# Patient Record
Sex: FEMALE | Race: WHITE | NOT HISPANIC OR LATINO | Employment: OTHER | URBAN - METROPOLITAN AREA
[De-identification: names, ages, dates, MRNs, and addresses within clinical notes are randomized per-mention and may not be internally consistent; named-entity substitution may affect disease eponyms.]

---

## 2017-10-14 PROBLEM — G45.1 TIA INVOLVING CAROTID ARTERY: Status: ACTIVE | Noted: 2017-10-14

## 2017-10-14 PROBLEM — G45.9 TRANSIENT CEREBRAL ISCHEMIA: Status: ACTIVE | Noted: 2017-10-14

## 2017-10-15 PROBLEM — G45.1 TIA INVOLVING CAROTID ARTERY: Status: ACTIVE | Noted: 2017-10-15

## 2018-05-24 ENCOUNTER — OFFICE VISIT (OUTPATIENT)
Dept: PRIMARY CARE CLINIC | Facility: CLINIC | Age: 63
End: 2018-05-24
Payer: COMMERCIAL

## 2018-05-24 VITALS
TEMPERATURE: 99 F | WEIGHT: 137.25 LBS | HEIGHT: 62 IN | DIASTOLIC BLOOD PRESSURE: 64 MMHG | SYSTOLIC BLOOD PRESSURE: 104 MMHG | BODY MASS INDEX: 25.26 KG/M2 | OXYGEN SATURATION: 97 % | HEART RATE: 51 BPM

## 2018-05-24 DIAGNOSIS — H61.23 BILATERAL IMPACTED CERUMEN: ICD-10-CM

## 2018-05-24 DIAGNOSIS — J02.9 ACUTE PHARYNGITIS, UNSPECIFIED ETIOLOGY: Primary | ICD-10-CM

## 2018-05-24 DIAGNOSIS — B34.9 VIRAL ILLNESS: ICD-10-CM

## 2018-05-24 LAB
CTP QC/QA: YES
S PYO RRNA THROAT QL PROBE: NEGATIVE

## 2018-05-24 PROCEDURE — 99999 PR PBB SHADOW E&M-EST. PATIENT-LVL IV: CPT | Mod: PBBFAC,,, | Performed by: NURSE PRACTITIONER

## 2018-05-24 PROCEDURE — 87081 CULTURE SCREEN ONLY: CPT

## 2018-05-24 PROCEDURE — 87880 STREP A ASSAY W/OPTIC: CPT | Mod: QW,S$GLB,, | Performed by: NURSE PRACTITIONER

## 2018-05-24 PROCEDURE — 99214 OFFICE O/P EST MOD 30 MIN: CPT | Mod: 25,S$GLB,, | Performed by: NURSE PRACTITIONER

## 2018-05-24 NOTE — PATIENT INSTRUCTIONS
Viral Pharyngitis (Sore Throat)    You (or your child, if your child is the patient) have pharyngitis (sore throat). This infection is caused by a virus. It can cause throat pain that is worse when swallowing, aching all over, headache, and fever. The infection may be spread by coughing, kissing, or touching others after touching your mouth or nose. Antibiotic medications do not work against viruses, so they are not used for treating this condition.  Home care  · If your symptoms are severe, rest at home. Return to work or school when you feel well enough.   · Drink plenty of fluids to avoid dehydration.  · For children: Use acetaminophen for fever, fussiness or discomfort. In infants over six months of age, you may use ibuprofen instead of acetaminophen. (NOTE: If your child has chronic liver or kidney disease or ever had a stomach ulcer or GI bleeding, talk with your doctor before using these medicines.) (NOTE: Aspirin should never be used in anyone under 18 years of age who is ill with a fever. It may cause severe liver damage.)   · For adults: You may use acetaminophen or ibuprofen to control pain or fever, unless another medicine was prescribed for this. (NOTE: If you have chronic liver or kidney disease or ever had a stomach ulcer or GI bleeding, talk with your doctor before using these medicines.)  · Throat lozenges or numbing throat sprays can help reduce pain. Gargling with warm salt water will also help reduce throat pain. For this, dissolve 1/2 teaspoon of salt in 1 glass of warm water. To help soothe a sore throat, children can sip on juice or a popsicle. Children 5 years and older can also suck on a lollipop or hard candy.  · Avoid salty or spicy foods, which can be irritating to the throat.  Follow-up care  Follow up with your healthcare provider or our staff if you are not improving over the next week.  When to seek medical advice  Call your healthcare provider right away if any of these  occur:  · Fever as directed by your doctor.  For children, seek care if:  ¨ Your child is of any age and has repeated fevers above 104°F (40°C).  ¨ Your child is younger than 2 years of age and has a fever of 100.4°F (38°C) that continues for more than 1 day.  ¨ Your child is 2 years old or older and has a fever of 100.4°F (38°C) that continues for more than 3 days.  · New or worsening ear pain, sinus pain, or headache  · Painful lumps in the back of neck  · Stiff neck  · Lymph nodes are getting larger  · Inability to swallow liquids, excessive drooling, or inability to open mouth wide due to throat pain  · Signs of dehydration (very dark urine or no urine, sunken eyes, dizziness)  · Trouble breathing or noisy breathing  · Muffled voice  · New rash  · Child appears to be getting sicker  Date Last Reviewed: 4/13/2015  © 6526-3010 [employer]. [address] [place] [address], [place] [address]. All rights reserved. This information is not intended as a substitute for professional medical care. Always follow your healthcare professional's instructions.

## 2018-05-24 NOTE — PROGRESS NOTES
Subjective:       Patient ID: Chance Napier is a 62 y.o. female.    Chief Complaint: Sore Throat (6 days- ears feel clogged)    Patient who is new to me presents with a new problem of sore throat. Patient had strep throat 2 months ago. Feels as though her ears are clogged. Last time she had this her ears required cleaning.       Sore Throat    This is a new problem. The current episode started in the past 7 days. The problem has been unchanged. Neither side of throat is experiencing more pain than the other. There has been no fever. Associated symptoms include ear pain. Pertinent negatives include no abdominal pain, congestion, coughing, diarrhea, headaches, neck pain, shortness of breath, trouble swallowing or vomiting. She has had exposure to strep (recently had strep). Treatments tried: mucinex and nyquil. The treatment provided mild relief.     Review of Systems   Constitutional: Negative for chills, fatigue and fever.   HENT: Positive for ear pain and sore throat. Negative for congestion, sinus pain, sinus pressure, sneezing and trouble swallowing.    Respiratory: Negative for cough, chest tightness, shortness of breath and wheezing.    Cardiovascular: Negative for chest pain, palpitations and leg swelling.   Gastrointestinal: Negative for abdominal distention, abdominal pain, constipation, diarrhea, nausea and vomiting.   Genitourinary: Negative for decreased urine volume, difficulty urinating, dysuria, frequency and urgency.   Musculoskeletal: Negative for arthralgias, gait problem, joint swelling, myalgias and neck pain.   Skin: Negative for rash and wound.   Neurological: Negative for dizziness, light-headedness, numbness and headaches.       Objective:      Physical Exam   Constitutional: She is oriented to person, place, and time. She appears well-developed and well-nourished.   HENT:   Head: Normocephalic and atraumatic.   Right Ear: External ear and ear canal normal.   Left Ear: External ear and  ear canal normal.   Nose: Nose normal.   Mouth/Throat: Posterior oropharyngeal erythema present.   Bilateral cerumen impaction   Eyes: Pupils are equal, round, and reactive to light.   Neck: Normal range of motion.   Cardiovascular: Normal rate, regular rhythm, normal heart sounds and intact distal pulses.    Pulmonary/Chest: Effort normal and breath sounds normal.   Abdominal: Soft. Bowel sounds are normal.   Musculoskeletal: Normal range of motion.   Neurological: She is alert and oriented to person, place, and time.   Skin: Skin is warm and dry.   Nursing note and vitals reviewed.      Assessment:       1. Acute pharyngitis, unspecified etiology    2. Bilateral impacted cerumen    3. Viral illness        Plan:       Acute pharyngitis, unspecified etiology  -     POCT Rapid Strep A  -     Strep A culture, throat    Bilateral impacted cerumen  -     Ear wax removal  -     carbamide peroxide 6.5 % otic solution 5 drop; Place 5 drops into the left ear one time.    Viral illness    Patient with negative rapid strep test will send for culture. Most likely viral, discussed symptomatic treatment. Patient with bilateral cerumen impaction, patient verbalized she would like cerumen removed. Patient to FU with any new or concerning symptoms.

## 2018-05-26 LAB — BACTERIA THROAT CULT: NORMAL

## 2018-11-02 PROBLEM — Z86.19 HISTORY OF SHINGLES: Status: ACTIVE | Noted: 2018-11-02

## 2018-11-02 PROBLEM — Z86.73 HISTORY OF TRANSIENT ISCHEMIC ATTACK (TIA): Status: ACTIVE | Noted: 2017-10-15

## 2018-11-02 PROBLEM — R20.2 PARESTHESIAS: Status: ACTIVE | Noted: 2018-11-02

## 2018-11-02 PROBLEM — H43.393 VITREOUS FLOATERS OF BOTH EYES: Status: ACTIVE | Noted: 2018-11-02

## 2018-11-02 PROBLEM — G45.9 TRANSIENT CEREBRAL ISCHEMIA: Status: RESOLVED | Noted: 2017-10-14 | Resolved: 2018-11-02

## 2018-11-05 ENCOUNTER — TELEPHONE (OUTPATIENT)
Dept: OBSTETRICS AND GYNECOLOGY | Facility: CLINIC | Age: 63
End: 2018-11-05

## 2018-11-05 DIAGNOSIS — Z12.4 ENCOUNTER FOR PAP SMEAR OF CERVIX WITH HPV DNA COTESTING: ICD-10-CM

## 2018-11-05 DIAGNOSIS — R30.9 PAINFUL URINATION: Primary | ICD-10-CM

## 2018-11-05 DIAGNOSIS — Z91.89 GYN EXAM FOR HIGH-RISK MEDICARE PATIENT: ICD-10-CM

## 2018-11-05 DIAGNOSIS — N89.8 VAGINAL DISCHARGE: ICD-10-CM

## 2018-11-05 NOTE — TELEPHONE ENCOUNTER
----- Message from Liliana Taylor sent at 11/5/2018 10:57 AM CST -----  Pt has orders for mammogram and labs  ..has appointment on  11/07 at 10:40 would like to schedule at that time  /  call [phone] (home)

## 2018-11-07 ENCOUNTER — HOSPITAL ENCOUNTER (OUTPATIENT)
Dept: RADIOLOGY | Facility: HOSPITAL | Age: 63
Discharge: HOME | End: 2018-11-07
Attending: OBSTETRICS & GYNECOLOGY
Payer: MEDICARE

## 2018-11-07 ENCOUNTER — OFFICE VISIT (OUTPATIENT)
Dept: OBSTETRICS AND GYNECOLOGY | Facility: CLINIC | Age: 63
End: 2018-11-07
Payer: MEDICARE

## 2018-11-07 VITALS
BODY MASS INDEX: 25.44 KG/M2 | DIASTOLIC BLOOD PRESSURE: 68 MMHG | WEIGHT: 138 LBS | SYSTOLIC BLOOD PRESSURE: 102 MMHG | RESPIRATION RATE: 18 BRPM | BODY MASS INDEX: 25.4 KG/M2 | WEIGHT: 138.25 LBS | HEIGHT: 62 IN | HEIGHT: 62 IN

## 2018-11-07 DIAGNOSIS — Z12.31 SCREENING MAMMOGRAM, ENCOUNTER FOR: ICD-10-CM

## 2018-11-07 DIAGNOSIS — Z01.419 ENCOUNTER FOR GYNECOLOGICAL EXAMINATION: Primary | ICD-10-CM

## 2018-11-07 DIAGNOSIS — Z01.419 ENCOUNTER FOR WELL WOMAN EXAM WITH ROUTINE GYNECOLOGICAL EXAM: ICD-10-CM

## 2018-11-07 PROCEDURE — 88175 CYTOPATH C/V AUTO FLUID REDO: CPT

## 2018-11-07 PROCEDURE — 99386 PREV VISIT NEW AGE 40-64: CPT | Mod: S$GLB,,, | Performed by: OBSTETRICS & GYNECOLOGY

## 2018-11-07 PROCEDURE — 99999 PR PBB SHADOW E&M-EST. PATIENT-LVL IV: CPT | Mod: PBBFAC,,, | Performed by: OBSTETRICS & GYNECOLOGY

## 2018-11-07 PROCEDURE — 77067 SCR MAMMO BI INCL CAD: CPT | Mod: 26,,, | Performed by: RADIOLOGY

## 2018-11-07 PROCEDURE — 77063 BREAST TOMOSYNTHESIS BI: CPT | Mod: 26,,, | Performed by: RADIOLOGY

## 2018-11-07 PROCEDURE — 87624 HPV HI-RISK TYP POOLED RSLT: CPT

## 2018-11-07 PROCEDURE — 77063 BREAST TOMOSYNTHESIS BI: CPT | Mod: TC,PN

## 2018-11-07 NOTE — PROGRESS NOTES
Subjective:       Patient ID: Chance Napier is a 62 y.o. female.    Chief Complaint:  Well Woman      History of Present Illness  HPI  Annual Exam-Postmenopausal  Patient presents for annual exam. The patient has no complaints today. The patient is not sexually active. GYN screening history: last pap: was normal and last mammogram: was normal. The patient is not taking hormone replacement therapy. Patient denies post-menopausal vaginal bleeding. The patient wears seatbelts: yes. The patient participates in regular exercise: not asked. Has the patient ever been transfused or tattooed?: no. The patient reports that there is not domestic violence in her life.    GYN & OB History  No LMP recorded. Patient is postmenopausal.   Date of Last Pap: 2013    OB History    Para Term  AB Living   2 2 0 0 0     SAB TAB Ectopic Multiple Live Births   0 0 0          # Outcome Date GA Lbr Johan/2nd Weight Sex Delivery Anes PTL Lv   2 Para            1 Para                   Review of Systems  Review of Systems   Constitutional: Negative for activity change, appetite change, chills, diaphoresis, fatigue, fever and unexpected weight change.   HENT: Negative for mouth sores and tinnitus.    Eyes: Negative for discharge and visual disturbance.   Respiratory: Negative for cough, shortness of breath and wheezing.    Cardiovascular: Negative for chest pain, palpitations and leg swelling.   Gastrointestinal: Negative for abdominal pain, bloating, blood in stool, constipation, diarrhea, nausea and vomiting.   Endocrine: Negative for diabetes, hair loss, hot flashes, hyperthyroidism and hypothyroidism.   Genitourinary: Negative for decreased libido, dyspareunia, dysuria, flank pain, frequency, genital sores, hematuria, menorrhagia, menstrual problem, pelvic pain, urgency, vaginal bleeding, vaginal discharge, vaginal pain, dysmenorrhea, urinary incontinence, postcoital bleeding, postmenopausal bleeding and vaginal  odor.   Musculoskeletal: Negative for back pain, joint swelling and myalgias.   Skin:  Negative for rash, no acne and hair changes.   Neurological: Negative for seizures, syncope, numbness and headaches.   Hematological: Negative for adenopathy. Does not bruise/bleed easily.   Psychiatric/Behavioral: Negative for depression and sleep disturbance. The patient is not nervous/anxious.    Breast: Negative for breast mass, breast pain, nipple discharge and skin changes          Objective:    Physical Exam:   Constitutional: She is oriented to person, place, and time. She appears well-developed and well-nourished. No distress.    HENT:   Head: Normocephalic.    Eyes: Pupils are equal, round, and reactive to light.    Neck: Normal range of motion.    Cardiovascular: Normal rate.     Pulmonary/Chest: Effort normal.   Breasts: no palpable masses or nipple discharge.        Abdominal: Soft. She exhibits no distension. There is no tenderness.     Genitourinary: Vagina normal and uterus normal. No vaginal discharge found.   Genitourinary Comments: Pap smear done of cervix           Musculoskeletal: Normal range of motion and moves all extremeties.       Neurological: She is alert and oriented to person, place, and time.    Skin: Skin is warm and dry.    Psychiatric: She has a normal mood and affect. Her behavior is normal. Judgment and thought content normal.          Assessment:        1. Encounter for gynecological examination    2. Screening mammogram, encounter for    3. Encounter for well woman exam with routine gynecological exam                Plan:      Mammogram today

## 2018-11-07 NOTE — LETTER
November 7, 2018      Radha Browning MD  [address] [place] [address]   [address]  [facility] [address]           [facility] [address]  [facility] [address]  Phone: [phone]  Fax: [phone]          Patient: Chance Napier   MR Number: [id number]   YOB: 1955   Date of Visit: 11/7/2018       Dear Dr. Radha Browning:    Thank you for referring Chance Napier to me for evaluation. Attached you will find relevant portions of my assessment and plan of care.    If you have questions, please do not hesitate to call me. I look forward to following Chance Napier along with you.    Sincerely,    Melvin Tripp MD    Enclosure  CC:  No Recipients    If you would like to receive this communication electronically, please contact [email or web address] or [phone] to request more information on [employer] Link access.    For providers and/or their staff who would like to refer a patient to [facility], please contact us through our one-stop-shop provider referral line, [facility], at [phone].    If you feel you have received this communication in error or would no longer like to receive these types of communications, please e-mail [email or web address]

## 2018-11-12 LAB
HPV HR 12 DNA CVX QL NAA+PROBE: NEGATIVE
HPV16 AG SPEC QL: NEGATIVE
HPV18 DNA SPEC QL NAA+PROBE: NEGATIVE

## 2019-03-13 ENCOUNTER — TELEPHONE (OUTPATIENT)
Dept: FAMILY MEDICINE | Facility: CLINIC | Age: 64
End: 2019-03-13

## 2019-03-13 ENCOUNTER — TELEPHONE (OUTPATIENT)
Dept: NEUROLOGY | Facility: CLINIC | Age: 64
End: 2019-03-13

## 2019-03-13 NOTE — TELEPHONE ENCOUNTER
----- Message from Vasyl Izaguirre sent at 3/13/2019  1:29 PM CDT -----  Contact: Patient  Type:  Patient Returning Call    Who Called:  Patient  Who Left Message for Patient:  Sugar Adam  Does the patient know what this is regarding?:  rescheduling  Best Call Back Number:  [phone]  Additional Information:  NA

## 2019-03-13 NOTE — TELEPHONE ENCOUNTER
----- Message from Hortencia Rader sent at 3/13/2019 12:00 PM CDT -----  Contact: [phone]  Patient is returning nurse's phone call.  Please call patient back at [phone].

## 2019-03-13 NOTE — TELEPHONE ENCOUNTER
Spoke with pt to reschedule. Message sent to johnny providers staff. Message forwarded to Lorie Taylor Staff

## 2019-03-19 ENCOUNTER — OFFICE VISIT (OUTPATIENT)
Dept: NEUROLOGY | Facility: CLINIC | Age: 64
End: 2019-03-19
Payer: MEDICARE

## 2019-03-19 VITALS
HEART RATE: 60 BPM | HEIGHT: 62 IN | DIASTOLIC BLOOD PRESSURE: 73 MMHG | RESPIRATION RATE: 20 BRPM | SYSTOLIC BLOOD PRESSURE: 116 MMHG | WEIGHT: 139.63 LBS | BODY MASS INDEX: 25.7 KG/M2

## 2019-03-19 DIAGNOSIS — Z86.73 HISTORY OF TIA (TRANSIENT ISCHEMIC ATTACK): Primary | ICD-10-CM

## 2019-03-19 PROCEDURE — 99999 PR PBB SHADOW E&M-EST. PATIENT-LVL III: CPT | Mod: PBBFAC,,, | Performed by: PSYCHIATRY & NEUROLOGY

## 2019-03-19 PROCEDURE — 3008F BODY MASS INDEX DOCD: CPT | Mod: CPTII,S$GLB,, | Performed by: PSYCHIATRY & NEUROLOGY

## 2019-03-19 PROCEDURE — 99215 OFFICE O/P EST HI 40 MIN: CPT | Mod: S$GLB,,, | Performed by: PSYCHIATRY & NEUROLOGY

## 2019-03-19 PROCEDURE — 1101F PT FALLS ASSESS-DOCD LE1/YR: CPT | Mod: CPTII,S$GLB,, | Performed by: PSYCHIATRY & NEUROLOGY

## 2019-03-19 PROCEDURE — 99999 PR PBB SHADOW E&M-EST. PATIENT-LVL III: ICD-10-PCS | Mod: PBBFAC,,, | Performed by: PSYCHIATRY & NEUROLOGY

## 2019-03-19 PROCEDURE — 1101F PR PT FALLS ASSESS DOC 0-1 FALLS W/OUT INJ PAST YR: ICD-10-PCS | Mod: CPTII,S$GLB,, | Performed by: PSYCHIATRY & NEUROLOGY

## 2019-03-19 PROCEDURE — 99215 PR OFFICE/OUTPT VISIT, EST, LEVL V, 40-54 MIN: ICD-10-PCS | Mod: S$GLB,,, | Performed by: PSYCHIATRY & NEUROLOGY

## 2019-03-19 PROCEDURE — 3008F PR BODY MASS INDEX (BMI) DOCUMENTED: ICD-10-PCS | Mod: CPTII,S$GLB,, | Performed by: PSYCHIATRY & NEUROLOGY

## 2019-03-19 RX ORDER — ATORVASTATIN CALCIUM 10 MG/1
10 TABLET, FILM COATED ORAL DAILY
Qty: 90 TABLET | Refills: 3 | Status: SHIPPED | OUTPATIENT
Start: 2019-03-19 | End: 2020-01-08

## 2019-03-19 NOTE — LETTER
March 19, 2019      Radha Browning MD  [address]  [place] [address]           [facility]  [address] [facility] [address] [place] [address]  Phone: [phone]  Fax: [phone]          Patient: Chance Napier   MR Number: [id number]   YOB: 1955   Date of Visit: 3/19/2019       Dear Dr. Radha Browning:    Thank you for referring Chance Napier to me for evaluation. Attached you will find relevant portions of my assessment and plan of care.    If you have questions, please do not hesitate to call me. I look forward to following Chance Napier along with you.    Sincerely,    Lorie Taylor MD    Enclosure  CC:  No Recipients    If you would like to receive this communication electronically, please contact [email or web address] or [phone] to request more information on [employer] Link access.    For providers and/or their staff who would like to refer a patient to [facility], please contact us through our one-stop-shop provider referral line, [facility] [place], at [phone].    If you feel you have received this communication in error or would no longer like to receive these types of communications, please e-mail [email or web address]

## 2019-03-19 NOTE — PROGRESS NOTES
Date: 3/19/2019    Patient ID: Chance Napier is a 63 y.o. female.    Referring Provider:  Radha Browning MD    Chief Complaint: Numbness and Transient Ischemic Attack      History of Present Illness:  Ms. Napier is a 63 y.o. female who presents referred by Radha Browning MD today for evaluation of TIA in 2017. She has not seen a neurologist since the TIA.     In October 2017, she was having lunch with friends and she had an overwhelming feeling of feeling drunk. She denies any vertigo or nausea but rather this was a fuzzy feeling in her head. She had slurred speech and numbness in her right hand. Her right leg was weak as well. She felt like she was going to fall when she stood up due to right leg weakness. Her friends initially brought her home but then decided to bring her to the hospital. Her friends brought her to the ER. The symptoms lasted about 15 minutes and resolved by the time she got to the ER. She stayed overnight. MRI brain was normal. Carotid US was normal. Echocardiogram was done outpatient and reportedly normal as well (not in the [facility] system. She was already taking aspirin 81 mg daily when this happened so she increased to 325 mg daily and has been on that since then. She was only taking baby aspirin daily as a preventative measure.     She had lipid panel in Nov 2018 with LDL 88. Not on a statin. She is a nonsmoker.    When she was about [age], she had numbness on her left side. She was on birth control pills at the time. This resolved after about 1 day.  No headache with this. She was told this was due to birth control and told to stop birth control. No other recurrence of these symptoms until Oct 2017.     She has noticed that she has felt her pulse more in her neck bilaterally lately.     She was adopted so doesn't know if family history of blood clots or strokes.     Review of Systems:   All other systems were reviewed and negative except as mentioned in the  "HPI    Allergies:  Review of patient's allergies indicates:  No Known Allergies    Current Medications:  Current Outpatient Medications   Medication Sig Dispense Refill    ascorbic acid, vitamin C, (VITAMIN C) 1000 MG tablet Take 1,000 mg by mouth once daily.      aspirin 325 MG tablet Take 325 mg by mouth once daily.      fluticasone (FLONASE) 50 mcg/actuation nasal spray 1 spray by Each Nare route once daily. 2 in left nare, 1 in right      multivitamin (THERAGRAN) per tablet Take 1 tablet by mouth once daily.      atorvastatin (LIPITOR) 10 MG tablet Take 1 tablet (10 mg total) by mouth once daily. 90 tablet 3     No current facility-administered medications for this visit.        Past Medical History:  Past Medical History:   Diagnosis Date    AR (allergic rhinitis)     Stroke 10/14/2017    TIA    TIA (transient ischemic attack)        Past Surgical History:  Past Surgical History:   Procedure Laterality Date    EYE SURGERY      TONSILLECTOMY      TUBAL LIGATION      WISDOM TOOTH EXTRACTION         Family History:  family history is not on file. She was adopted.    Social History:   reports that  has never smoked. she has never used smokeless tobacco. She reports that she drinks alcohol. She reports that she does not use drugs.    Physical Exam:  Vitals:    03/19/19 1549   BP: 116/73   Pulse: 60   Resp: 20   Weight: 63.3 kg (139 lb 9.6 oz)   Height: 5' 2" (1.575 m)   PainSc: 0-No pain     Body mass index is 25.53 kg/m².  General: Well developed, well nourished.  No acute distress.  Eyes: no ocular hemorrhages  Musculoskeletal: No obvious joint deformities, moves all extremities well.  Peripheral vascular: No edema noted    Neurological Exam:  Mental status: Awake, alert, and oriented to person, place, and time. Recent and remote memory appear to be intact. Attention, concentration, and fund of knowledge regarding recent events normal.   Speech/Language: No dysarthria or aphasia on conversation. "   Cranial nerves: Visual fields full. Pupils equal round and reactive to light, extraocular movements intact, facial strength and sensation intact bilaterally, palate and tongue midline, hearing grossly intact bilaterally. Shoulder shrug normal bilaterally.   Motor: 5 out of 5 strength throughout the upper and lower extremities bilaterally. Normal bulk and tone.   Sensation: Intact to light touch, pinprick, and vibration bilaterally.  DTR: 2+ at the knees and biceps bilaterally.  Coordination: Finger-nose-finger testing and rapid alternating movements normal bilaterally. No tremor.   Gait: Normal gait including tandem      Data:  I have personally reviewed the referring provider's notes, labs, & imaging made available to me today.       Labs:  CBC:   Lab Results   Component Value Date    WBC 4.44 11/07/2018    HGB 13.8 11/07/2018    HCT 41.8 11/07/2018     11/07/2018    MCV 91 11/07/2018    RDW 14.3 11/07/2018     BMP:   Lab Results   Component Value Date     11/07/2018    K 4.4 11/07/2018     11/07/2018    CO2 27 11/07/2018    BUN 21 (H) 11/07/2018    CREATININE 0.78 11/07/2018    GLU 88 11/07/2018    CALCIUM 9.5 11/07/2018     LFTS;   Lab Results   Component Value Date    PROT 7.3 11/07/2018    ALBUMIN 4.3 11/07/2018    BILITOT 0.6 11/07/2018    AST 31 11/07/2018    ALKPHOS 77 11/07/2018    ALT 32 11/07/2018     COAGS:   Lab Results   Component Value Date    INR 1.0 10/14/2017     FLP:   Lab Results   Component Value Date    CHOL 158 11/07/2018    HDL 55 11/07/2018    LDLCALC 88.0 11/07/2018    TRIG 75 11/07/2018    CHOLHDL 34.8 11/07/2018         Imaging:  I have personally reviewed the imaging, MRI brain is normal.     Assessment and Plan:  Ms. Napier is a 63 y.o. female referred to me by Radha Browning MD for evaluation of TIA that occurred in 2017. I agree this sounds like a TIA. Old problem but new to me. To complete her workup, we will order CTA of the head and neck and  hypercoagulable workup given her TIA when she was in her 20s while on birth control. I have requested her echocardiogram results. If this did not check for PFO, we should repeat here. I discussed that while her cholesterol is normal, our LDL goal is <70 in TIA or stroke so I have added low dose atorvastatin. I advised her to let me know if she has muscle aching or side effects. Her BP and glucose are normal and she is not a smoker. I will contact her with these results and will follow her yearly. We discussed that in general if someone has a TIA while on aspirin 81 mg daily, we switch to plavix but she has done well on full strength aspirin for 1.5 years without any further TIAs so we will keep her on that for now.     History of TIA (transient ischemic attack)  -     Creatinine, serum; Future; Expected date: 03/19/2019  -     CTA Head and Neck (xpd); Future; Expected date: 03/19/2019  -     FACTOR 5 LEIDEN; Future; Expected date: 03/19/2019  -     PROTHROMBIN GENE MUTATION; Future; Expected date: 03/19/2019  -     PROTEIN C FUNCTIONAL ACTIVITY; Future; Expected date: 03/19/2019  -     PROTEIN S FUNCTIONAL ACTIVITY; Future; Expected date: 03/19/2019  -     LUPUS ANTICOAGULANT (DRVVT); Future; Expected date: 03/19/2019  -     Homocysteine, serum; Future; Expected date: 03/19/2019    Other orders  -     atorvastatin (LIPITOR) 10 MG tablet; Take 1 tablet (10 mg total) by mouth once daily.  Dispense: 90 tablet; Refill: 3

## 2019-03-27 ENCOUNTER — LAB VISIT (OUTPATIENT)
Dept: LAB | Facility: HOSPITAL | Age: 64
End: 2019-03-27
Attending: PSYCHIATRY & NEUROLOGY
Payer: MEDICARE

## 2019-03-27 DIAGNOSIS — Z86.73 HISTORY OF TIA (TRANSIENT ISCHEMIC ATTACK): ICD-10-CM

## 2019-03-27 LAB
CREAT SERPL-MCNC: 0.8 MG/DL (ref 0.5–1.4)
EST. GFR  (AFRICAN AMERICAN): >60 ML/MIN/1.73 M^2
EST. GFR  (NON AFRICAN AMERICAN): >60 ML/MIN/1.73 M^2
HCYS SERPL-SCNC: 8 UMOL/L (ref 4–15.5)

## 2019-03-27 PROCEDURE — 36415 COLL VENOUS BLD VENIPUNCTURE: CPT | Mod: PO

## 2019-03-27 PROCEDURE — 85613 RUSSELL VIPER VENOM DILUTED: CPT

## 2019-03-27 PROCEDURE — 82565 ASSAY OF CREATININE: CPT

## 2019-03-27 PROCEDURE — 83090 ASSAY OF HOMOCYSTEINE: CPT

## 2019-03-27 PROCEDURE — 85303 CLOT INHIBIT PROT C ACTIVITY: CPT

## 2019-03-27 PROCEDURE — 85305 CLOT INHIBIT PROT S TOTAL: CPT

## 2019-03-28 LAB — LA PPP-IMP: NEGATIVE

## 2019-03-29 LAB
APTT PROTEIN C ACTIVATOR+FV DP/APTT PPP: 108 % (ref 70–140)
PROT S ACT/NOR PPP: 84 % (ref 70–140)

## 2019-04-04 ENCOUNTER — HOSPITAL ENCOUNTER (OUTPATIENT)
Dept: RADIOLOGY | Facility: HOSPITAL | Age: 64
Discharge: HOME | End: 2019-04-04
Attending: PSYCHIATRY & NEUROLOGY
Payer: MEDICARE

## 2019-04-04 DIAGNOSIS — Z86.73 HISTORY OF TIA (TRANSIENT ISCHEMIC ATTACK): ICD-10-CM

## 2019-04-04 PROCEDURE — 70498 CT ANGIOGRAPHY NECK: CPT | Mod: 26,,, | Performed by: RADIOLOGY

## 2019-04-04 PROCEDURE — 70496 CTA HEAD AND NECK (XPD): ICD-10-PCS | Mod: 26,,, | Performed by: RADIOLOGY

## 2019-04-04 PROCEDURE — 70496 CT ANGIOGRAPHY HEAD: CPT | Mod: TC,PO

## 2019-04-04 PROCEDURE — 70498 CTA HEAD AND NECK (XPD): ICD-10-PCS | Mod: 26,,, | Performed by: RADIOLOGY

## 2019-04-04 PROCEDURE — 70496 CT ANGIOGRAPHY HEAD: CPT | Mod: 26,,, | Performed by: RADIOLOGY

## 2019-04-04 PROCEDURE — 25500020 PHARM REV CODE 255: Mod: PO | Performed by: PSYCHIATRY & NEUROLOGY

## 2019-04-04 RX ADMIN — IOHEXOL 100 ML: 350 INJECTION, SOLUTION INTRAVENOUS at 01:04

## 2019-11-08 PROBLEM — E78.00 PURE HYPERCHOLESTEROLEMIA: Status: ACTIVE | Noted: 2019-11-08

## 2019-11-08 PROBLEM — E78.00 PURE HYPERCHOLESTEROLEMIA: Status: RESOLVED | Noted: 2019-11-08 | Resolved: 2019-11-08

## 2019-12-20 DIAGNOSIS — Z12.31 ENCOUNTER FOR SCREENING MAMMOGRAM FOR BREAST CANCER: Primary | ICD-10-CM

## 2019-12-23 ENCOUNTER — HOSPITAL ENCOUNTER (OUTPATIENT)
Dept: RADIOLOGY | Facility: HOSPITAL | Age: 64
Discharge: HOME | End: 2019-12-23
Attending: SPECIALIST
Payer: MEDICARE

## 2019-12-23 VITALS — HEIGHT: 62 IN | WEIGHT: 138.88 LBS | BODY MASS INDEX: 25.55 KG/M2

## 2019-12-23 DIAGNOSIS — Z12.31 ENCOUNTER FOR SCREENING MAMMOGRAM FOR BREAST CANCER: ICD-10-CM

## 2019-12-23 PROCEDURE — 77067 MAMMO DIGITAL SCREENING BILAT WITH TOMOSYNTHESIS_CAD: ICD-10-PCS | Mod: 26,,, | Performed by: RADIOLOGY

## 2019-12-23 PROCEDURE — 77063 BREAST TOMOSYNTHESIS BI: CPT | Mod: 26,,, | Performed by: RADIOLOGY

## 2019-12-23 PROCEDURE — 77067 SCR MAMMO BI INCL CAD: CPT | Mod: 26,,, | Performed by: RADIOLOGY

## 2019-12-23 PROCEDURE — 77063 MAMMO DIGITAL SCREENING BILAT WITH TOMOSYNTHESIS_CAD: ICD-10-PCS | Mod: 26,,, | Performed by: RADIOLOGY

## 2019-12-23 PROCEDURE — 77067 SCR MAMMO BI INCL CAD: CPT | Mod: TC,PN

## 2020-01-08 RX ORDER — ATORVASTATIN CALCIUM 10 MG/1
TABLET, FILM COATED ORAL
Qty: 90 TABLET | Refills: 3 | Status: SHIPPED | OUTPATIENT
Start: 2020-01-08 | End: 2020-12-02

## 2020-01-28 ENCOUNTER — TELEPHONE (OUTPATIENT)
Dept: NEUROLOGY | Facility: CLINIC | Age: 65
End: 2020-01-28

## 2020-01-28 NOTE — TELEPHONE ENCOUNTER
Spoke with pt. Informed booked out until April schedule opens. Added to wait list for next available appt. Verbalized understanding.

## 2020-01-28 NOTE — TELEPHONE ENCOUNTER
----- Message from Sylvia Larsen sent at 1/28/2020  2:20 PM CST -----  Type: Needs Medical Advice    Who Called:  Patient - Chance    Best Call Back Number:  [phone]    Additional Information: patient received the letter for reminder follow up and would like to schedule with dr bundy please contact her directly to follow up and advise

## 2020-03-10 ENCOUNTER — TELEPHONE (OUTPATIENT)
Dept: NEUROLOGY | Facility: CLINIC | Age: 65
End: 2020-03-10

## 2020-03-10 NOTE — TELEPHONE ENCOUNTER
----- Message from Donato Barnhart sent at 3/10/2020  9:02 AM CDT -----  Contact: Pt  Type:  Patient Returning Call    Who Called:  pt  Who Left Message for Patient:  Pt received letter in mail to follow up  Does the patient know what this is regarding?:  Stroke 2 years ago  Best Call Back Number:    Additional Information:  Please give pt a call to schedule as soon as possible. Thank you

## 2020-03-10 NOTE — TELEPHONE ENCOUNTER
Returned call to pt and notified of Dr. Taylor's leave. Notified that Dr. Taylor not back until July/August timeframe. Added to waiting list per pt preference. Verbalized understanding.

## 2020-04-07 ENCOUNTER — PATIENT MESSAGE (OUTPATIENT)
Dept: NEUROLOGY | Facility: CLINIC | Age: 65
End: 2020-04-07

## 2020-04-07 NOTE — PROGRESS NOTES
"  NEUROLOGY  Outpatient Follow Up  *Telemedicine  [facility]  [address] [facility], [place] [address]  [phone] (office) / [phone] (fax)    Patient Name:  Chance Napier  :  1955  MR #:  [id number]  Acct #:  [id number]    Date of Neurology Visit: 2020  Other Physicians:  Radha Browning MD (Primary Care Physician); No ref. provider found (Referring)      Chief Complaint: Transient Ischemic Attack      Interval history:  Chance Napier is a 64 y.o. female seen in follow up for history of TIA. She is new to me today. She denies any acute complaints today. She experienced some episodic dizziness last week, but this has since resolved. She has chronic cerumen buildup in the R ear and was also having some sinus issues at the time, so she attributed her dizziness to that. She was started on low dose Lipitor at her last visit with Dr. Taylor and is tolerating it well. She denies muscle aches/pain. She states that she has gained a little bit of weight despite diet and exercise and wonders if this is r/t Lipitor. Had her annual wellness visit with Dr. Browning in November with repeat labs - see below. She does endorse that every now and then she can hear a "creaking" sound in her neck, which is new. She denies neck pain, neck stiffness, arm weakness. She also describes occasional tingling type pain in both hands that occurs infrequently at night and improves with movement. These sensations are not bothersome enough to her to warrant any further workup, however. She reportedly had a TTE done years ago which she states was normal, but we do not have these records. She is not sure if she had a bubble study. States that the TTE was done on the [place], but she doesn't remember where.   Reviewed imaging and labs that she had done since her last in person visit -- nothing of concern noted. She continues on ASA 325mg for stroke prevention as well.     From last visit with Dr." Brandon 3/2019:  In October 2017, she was having lunch with friends and she had an overwhelming feeling of feeling drunk. She denies any vertigo or nausea but rather this was a fuzzy feeling in her head. She had slurred speech and numbness in her right hand. Her right leg was weak as well. She felt like she was going to fall when she stood up due to right leg weakness. Her friends initially brought her home but then decided to bring her to the hospital. Her friends brought her to the ER. The symptoms lasted about 15 minutes and resolved by the time she got to the ER. She stayed overnight.   MRI brain was normal. Carotid US was normal. Echocardiogram was done outpatient and reportedly normal as well (not in the [facility] system). She was already taking aspirin 81 mg daily when this happened so she increased to 325 mg daily and has been on that since then. She was only taking baby aspirin daily as a preventative measure.   She had lipid panel in Nov 2018 with LDL 88. Not on a statin. She is a nonsmoker.  When she was about [age], she had numbness on her left side. She was on birth control pills at the time. This resolved after about 1 day.  No headache with this. She was told this was due to birth control and told to stop birth control. No other recurrence of these symptoms until Oct 2017.   She has noticed that she has felt her pulse more in her neck bilaterally lately.   She was adopted so doesn't know if family history of blood clots or strokes.     Treatment to date:  ASA 81mg, ASA 325mg, Lipitor 10mg     Review of Systems:    General: Weight gain: Yes, Weight Loss: No, Fatigue: No,   Fever: No, Chills: No, Night Sweats: No, Insomnia: No, Excessive sleeping: No   Respiratory:  Cough: No, Shortness of Breath: No,   Wheezing: No, Excessive Snoring: No, Coughing up blood: No  Endocrine: Heat Intolerance: No, Cold Intolerance: No,   Excessive Thirst: No, Excessive Hunger: No,   Eyes:  Blurred Vision: No, Double Vision:  No,   Light Sensitivity: No, Eye pain: No  Musculoskeletal: Muscle Aches/Pain: No, Joint Pain/Swelling: No, Muscle Cramps: No, Muscle Weakness: No, Neck Pain: No, Back Pain: No   Neurological: Difficulty Walking/Falls: No, Headache Migraine: No, Dizziness/Vertigo: Yes, Fainting: No, Difficulty with Speech: No, Weakness: No, Tingling/Numbness: Yes, Tremors: No, Memory Problems: No, Seizures: No, Difficulty Swallowing: No, Altered Taste: No.  Cardiovascular: Chest Pain: No, Shortness of Breath: No,   Palpitations: No,  Gastrointestinal: Nausea/Vomiting: No, Constipation: No, Diarrhea: No, Bloody Stools: No   Psych/Cog:  Depression: No, Anxiety: No, Hallucinations: No, Problems Concentrating: No  : Frequent Urination: No, Incontinence: No, Blood of Urine: No, Urinary Infections: No, Changes in Sex Drive: No   ENT:Hearing Loss: No, Earache: No, Ringing in Ears: Yes,   Facial Pain: No, Chronic Congestion: No   Immune: Seasonal Allergies: No, Hives and/or Rashes: No  The remainder of the review of twelve body systems was reviewed and normal.    Past Medical, Surgical, Family & Social History:   Reviewed and updated.    Home Medications:     Current Outpatient Medications:     ascorbic acid, vitamin C, (VITAMIN C) 1000 MG tablet, Take 1,000 mg by mouth once daily., Disp: , Rfl:     aspirin 325 MG tablet, Take 325 mg by mouth once daily., Disp: , Rfl:     atorvastatin (LIPITOR) 10 MG tablet, TAKE 1 TABLET EVERY DAY, Disp: 90 tablet, Rfl: 3    fluticasone (FLONASE) 50 mcg/actuation nasal spray, 1 spray by Each Nare route once daily. 2 in left nare, 1 in right, Disp: , Rfl:     multivitamin (THERAGRAN) per tablet, Take 1 tablet by mouth once daily., Disp: , Rfl:     Physical Examination:  There were no vitals taken for this visit.   Limited exam, as this is a virtual visit    GENERAL:  General appearance: Well, non-toxic appearing.  No apparent distress.  Neck: full ROM    MENTAL STATUS:  Alertness, attention span &  concentration: normal.  Language: normal.  Orientation to self, place & time:  normal.    SPEECH:  Clear and fluent.  Follows complex commands.    CRANIAL NERVES:  EOMI, No ptosis, No nystagmus.  V - Facial sensation: normal.  VII - Face symmetry & mobility: normal.  IX, X - Palate: mobile & midline.  XI - Shoulder shrug: normal.  XII - Tongue protrusion: normal.    GROSS MOTOR:  Ambulatory  Abnormal movements: none observed  Finger-nose normal  No drift      Diagnostic Data Reviewed:    Imaging:  CTA head and neck 3/2019:  No obvious aneurysm or stenosis is noted involving the [id number], middle, posterior, or anterior cerebral arteries.  The internal carotid arteries appear patent bilaterally without significant stenosis.  Incidental note is made of an empty sella configuration to the pituitary  Degenerative changes are noted within the spine    MRI brain without contrast 10/2017:  No evidence of acute or focal intracranial abnormality.    CUS 10/2017:  1. There are no findings to suggest hemodynamically significant stenosis or appreciable atherosclerotic changes.  2. Normal antegrade bilateral vertebral artery flow    EKG 12/2017:  NSR    Labs:  Component      Latest Ref Rng & Units 11/11/2019 3/27/2019   WBC      3.90 - 12.70 K/uL 5.54    RBC      4.00 - 5.40 M/uL 4.11    Hemoglobin      12.0 - 16.0 g/dL 12.8    Hematocrit      37.0 - 48.5 % 38.0    MCV      82 - 98 fL 93    MCH      27.0 - 31.0 pg 31.1 (H)    MCHC      32.0 - 36.0 g/dL 33.7    RDW      11.5 - 14.5 % 14.6 (H)    Platelets      150 - 350 K/uL 167    MPV      9.2 - 12.9 fL 13.1 (H)    Immature Granulocytes      0.0 - 0.5 % 0.4    Gran # (ANC)      1.8 - 7.7 K/uL 3.1    Immature Grans (Abs)      0.00 - 0.04 K/uL 0.02    Lymph #      1.0 - 4.8 K/uL 1.8    Mono #      0.3 - 1.0 K/uL 0.6    Eos #      0.0 - 0.5 K/uL 0.1    Baso #      0.00 - 0.20 K/uL 0.01    nRBC      0 /100 WBC 0    Gran%      38.0 - 73.0 % 55.3    Lymph%      18.0 - 48.0 %  31.9    Mono%      4.0 - 15.0 % 9.9    Eosinophil%      0.0 - 8.0 % 2.3    Basophil%      0.0 - 1.9 % 0.2    Differential Method       Automated    Sodium      136 - 145 mmol/L 143    Potassium      3.5 - 5.1 mmol/L 4.3    Chloride      95 - 110 mmol/L 108    CO2      22 - 31 mmol/L 25    Glucose      70 - 110 mg/dL 97    BUN, Bld      7 - 18 mg/dL 16    Creatinine      0.50 - 1.40 mg/dL 0.76    Calcium      8.4 - 10.2 mg/dL 9.7    PROTEIN TOTAL      6.0 - 8.4 g/dL 6.9    Albumin      3.5 - 5.2 g/dL 4.1    BILIRUBIN TOTAL      0.2 - 1.3 mg/dL 0.6    Alkaline Phosphatase      38 - 145 U/L 80    AST      14 - 36 U/L 33    ALT      10 - 44 U/L 35    Anion Gap      8 - 16 mmol/L 10    eGFR if African American      >60 mL/min/1.73 m:2 >60    eGFR if non African American      >60 mL/min/1.73 m:2 >60    Cholesterol      120 - 199 mg/dL 109 (L)    Triglycerides      30 - 150 mg/dL 63    HDL      40 - 75 mg/dL 50    LDL Cholesterol External      63.0 - 159.0 mg/dL 46.4 (L)    Hdl/Cholesterol Ratio      20.0 - 50.0 % 45.9    Total Cholesterol/HDL Ratio      2.0 - 5.0 2.2    Non-HDL Cholesterol      mg/dL 59    Protein C Activity      70 - 140 %  108   Protein S Activity      70 - 140 %  84   DRVVT, Lupus Anticoagulant      Negative  Negative   Homocysteine      4.0 - 15.5 umol/L  8.0     Assessment and Plan:  Problem List Items Addressed This Visit        Neuro    History of transient ischemic attack (TIA) - Primary    Overview     Imaging: MRI 10/2017, CUS 10/2017, CTA head and neck 3/2019  Risk factors: HLD, adopted - unknown FH  Prevention: ASA 325mg, Lipitor 10mg            Current Assessment & Plan     Continue stroke prevention measures with ASA 325mg daily and Lipitor 10mg daily at present  Given recent labs with low LDL since starting statin therapy, will order lipoprotein analysis for further evaluation prior to making any decision to stop statin therapy  Hypercoag panel negative to date, but incomplete -- will  reorder missing studies   For completeness, recommend TTE with bubble study, as we have not been able to obtain previous records            Other    Paresthesias    Current Assessment & Plan     Symptoms described sound like classic CTS. Discussed that pt could try using splints at night, but pt states that sx are not bothersome to her.            Other Visit Diagnoses     TIA (transient ischemic attack)            The patient can return to clinic in 1 year or sooner if she has any new symptoms.     Important to note, also  has a past medical history of AR (allergic rhinitis), Stroke (10/14/2017), and TIA (transient ischemic attack).    The patient location is at home in [place], [place]  The chief complaint leading to consultation is TIA follow up   Visit type: Virtual visit with synchronous audio and video  Total time spent with patient: 20 minutes   Each patient to whom he or she provides medical services by telemedicine is:  (1) informed of the relationship between the physician and patient and the respective role of any other health care provider with respect to management of the patient; and (2) notified that he or she may decline to receive medical services by telemedicine and may withdraw from such care at any time.      I have discussed this patient's case in detail with Dr. Medina.        Faviola Perez, [facility]

## 2020-04-08 ENCOUNTER — OFFICE VISIT (OUTPATIENT)
Dept: NEUROLOGY | Facility: CLINIC | Age: 65
End: 2020-04-08
Payer: MEDICARE

## 2020-04-08 DIAGNOSIS — R20.2 PARESTHESIAS: ICD-10-CM

## 2020-04-08 DIAGNOSIS — Z86.73 HISTORY OF TRANSIENT ISCHEMIC ATTACK (TIA): Primary | ICD-10-CM

## 2020-04-08 DIAGNOSIS — G45.9 TIA (TRANSIENT ISCHEMIC ATTACK): ICD-10-CM

## 2020-04-08 PROCEDURE — 1159F PR MEDICATION LIST DOCUMENTED IN MEDICAL RECORD: ICD-10-PCS | Mod: 95,,, | Performed by: NURSE PRACTITIONER

## 2020-04-08 PROCEDURE — 99212 PR OFFICE/OUTPT VISIT, EST, LEVL II, 10-19 MIN: ICD-10-PCS | Mod: 95,,, | Performed by: NURSE PRACTITIONER

## 2020-04-08 PROCEDURE — 99212 OFFICE O/P EST SF 10 MIN: CPT | Mod: 95,,, | Performed by: NURSE PRACTITIONER

## 2020-04-08 PROCEDURE — 1159F MED LIST DOCD IN RCRD: CPT | Mod: 95,,, | Performed by: NURSE PRACTITIONER

## 2020-04-08 NOTE — ASSESSMENT & PLAN NOTE
Continue stroke prevention measures with ASA 325mg daily and Lipitor 10mg daily at present  Given recent labs with low LDL since starting statin therapy, will order lipoprotein analysis for further evaluation prior to making any decision to stop statin therapy  Hypercoag panel negative to date, but incomplete -- will reorder missing studies   For completeness, recommend TTE with bubble study, as we have not been able to obtain previous records

## 2020-04-08 NOTE — ASSESSMENT & PLAN NOTE
Symptoms described sound like classic CTS. Discussed that pt could try using splints at night, but pt states that sx are not bothersome to her.

## 2020-04-08 NOTE — PATIENT INSTRUCTIONS
Continue taking the ASA 325mg, as well as Lipitor 10mg daily for stroke prevention at this time.     When you are able, please have the labs that we discussed drawn. My office will notify you of the results when available.     When you are able, please have the ultrasound of your heart done. We are doing this to complete your stroke workup.     For the numbness and tingling sensations that you described during sleep, you can try wearing wrist splints at night. If you have any worsening of these symptoms, please feel free to notify me.     Follow up as planned with your PCP.     STROKE EDUCATION  1. I have discussed the patient's stroke risk factors and the importance to modify them in order to reduce the risk of future events.   2. Discussed the importance of a healthy diet and daily exercise in order to reduce the risk of future strokes.  3. Reviewed the usual stroke warning signs and symptoms, and the need to activate EMS (call 911) as soon as the symptoms present.   *Sudden onset numbness or weakness of the face, arm, or leg; especially on one side of he body  *Sudden confusion, trouble speaking, or understanding  *Sudden trouble seeing in one or both eyes  *Sudden trouble walking, dizziness, loss of coordination  *Sudden severe headache with no known cause  4. Discussed target goal range for BP <140/90 diabetic patients <130/80  5. Discussed target goal range for Lipids- Total Cholesterol <200, LDL <100 diabetic patients <70  6. Discussed target goal range for HgA1c <7.0  7. Discussed mportance of evaluation for and control of sleep apnea  8. I have given the patient/caregiver written instructions and information regarding stroke.  
36.7

## 2020-04-23 ENCOUNTER — OFFICE VISIT (OUTPATIENT)
Dept: OPHTHALMOLOGY | Facility: CLINIC | Age: 65
End: 2020-04-23
Payer: MEDICARE

## 2020-04-23 ENCOUNTER — TELEPHONE (OUTPATIENT)
Dept: OPHTHALMOLOGY | Facility: CLINIC | Age: 65
End: 2020-04-23

## 2020-04-23 DIAGNOSIS — H43.813 POSTERIOR VITREOUS DETACHMENT, BOTH EYES: ICD-10-CM

## 2020-04-23 DIAGNOSIS — H04.123 INSUFFICIENCY OF TEAR FILM OF BOTH EYES: Primary | ICD-10-CM

## 2020-04-23 PROCEDURE — 99999 PR PBB SHADOW E&M-EST. PATIENT-LVL III: CPT | Mod: PBBFAC,,, | Performed by: OPHTHALMOLOGY

## 2020-04-23 PROCEDURE — 92004 PR EYE EXAM, NEW PATIENT,COMPREHESV: ICD-10-PCS | Mod: S$GLB,,, | Performed by: OPHTHALMOLOGY

## 2020-04-23 PROCEDURE — 92004 COMPRE OPH EXAM NEW PT 1/>: CPT | Mod: S$GLB,,, | Performed by: OPHTHALMOLOGY

## 2020-04-23 PROCEDURE — 99999 PR PBB SHADOW E&M-EST. PATIENT-LVL III: ICD-10-PCS | Mod: PBBFAC,,, | Performed by: OPHTHALMOLOGY

## 2020-04-23 NOTE — PROGRESS NOTES
HPI     Triage pt  Hx of TIA.  Patient states OD upper part vision was blurry x this morning which lasted   half hour.  No eye pain,but occasional eye pressure.  Hx of floaters, but no flashes.    I have personally interviewed the patient, reviewed the history and   examined the patient and agree with the technician's exam.    She described a hazy, gray area in the vision of her right eye only in the   center and temporal field lasting 30 minutes. No flashes or floaters. She   usually sleeps on her left side. Feeling of slight pressure in her right   eye.    Last edited by Samuel Baez MD on 4/23/2020  3:01 PM. (History)            Assessment /Plan     For exam results, see Encounter Report.    Insufficiency of tear film of both eyes    Posterior vitreous detachment, both eyes      Ms. Napier's symptoms are consistent with surface drying of her right eye. I recommended using a lubricating gel or ointment at bedtime. She will continue to use Systane eye drops as desired. She has old looking posterior vitreous detachments in both eyes without peripheral retinal traction or tear in either eye. No further work up is indicated. She will return to me as needed.

## 2020-04-23 NOTE — TELEPHONE ENCOUNTER
----- Message from Viki Peterson sent at 4/23/2020 12:56 PM CDT -----  Contact: Chance  Ms. Napier was returning your call. She can be reached at [phone].

## 2020-04-23 NOTE — TELEPHONE ENCOUNTER
----- Message from Brenda Aguilera sent at 4/23/2020 12:00 PM CDT -----  Contact: Chance  [facility]  OPHTHALMOLOGY TRIAGE CALL    Date:  4/23/2020   Time:  12:00 PM  Person Calling: Chance  Phone Number:  [phone] (home)   Call received by:  Brenda Aguilera  Patient in Clinic now:  No  Which eye: Right  Name of Patient's Eye Dr.:  N/A  Date Last Seen:    Disposition of patient: ER follow up pt went in because she had a cloudy haze that came over her eye this morning that lasted for about 1/2 hour.  She went to ER because she has had a TIA in the past and was very concerned.  They told her all was negative on that front and that she should fu    For How Long:    Additional Comments:  [phone]

## 2020-04-23 NOTE — TELEPHONE ENCOUNTER
Pt went to the ED today after having a cloudy haze in her vision this am.   Pt has a previous hx of TIA.   TIA ruled out today, advised to see Ophthalmologist.   Booked to see Dr. Baez today at 3:00.   Pt verbalized understanding.

## 2020-04-23 NOTE — LETTER
[facility] - Ophthalmology  [address]  [facility] [address]  Phone: [phone]  Fax: [phone]   April 23, 2020    Radha Browning MD  [address] [place] [address] Dr [place] [address]    Patient: Chance Napier   MR Number: [id number]   YOB: 1955   Date of Visit: 4/23/2020       Dear Dr. Browning:    Thank you for referring Chance Napier to me for evaluation. Here is my assessment and plan of care:    Assessment:  /Plan     For exam results, see Encounter Report.    Insufficiency of tear film of both eyes    Posterior vitreous detachment, both eyes      Ms. Wilson symptoms are consistent with surface drying of her right eye. I recommended using a lubricating gel or ointment at bedtime. She will continue to use Systane eye drops as desired. She has old looking posterior vitreous detachments in both eyes without peripheral retinal traction or tear in either eye. No further work up is indicated. She will return to me as needed.          Plan:  For exam results, see Encounter Report.    Insufficiency of tear film of both eyes    Posterior vitreous detachment, both eyes      Ms. Wilson symptoms are consistent with surface drying of her right eye. I recommended using a lubricating gel or ointment at bedtime. She will continue to use Systane eye drops as desired. She has old looking posterior vitreous detachments in both eyes without peripheral retinal traction or tear in either eye. No further work up is indicated. She will return to me as needed.            Below you will find my full exam findings. If you have questions, please do not hesitate to call me. I look forward to following Ms. Chance Napier along with you.    Sincerely,          Samuel Baez MD       CC  No Recipients             Base Eye Exam     Visual Acuity (Snellen - Linear)       Right Left    Dist sc 20/25 20/25          Tonometry (Applanation, 2:56 PM)       Right Left    Pressure 14 14          Pupils        Dark Light Shape React APD    Right 4 2 Round Brisk None    Left 4 2 Round Brisk None          Visual Fields       Right Left     Full Full          Extraocular Movement       Right Left     Full, Ortho Full, Ortho          Neuro/Psych     Oriented x3:  Yes    Mood/Affect:  Normal          Dilation     Both eyes:  1% Mydriacyl, 2.5% Phenylephrine @ 2:57 PM            Slit Lamp and Fundus Exam     External Exam       Right Left    External Normal Normal          Slit Lamp Exam       Right Left    Lids/Lashes Normal Normal    Conjunctiva/Sclera White and quiet White and quiet    Cornea Rapid tear break up with 1+ PEK Rapid tear break up with 1+ PEK    Anterior Chamber Deep and quiet Deep and quiet    Iris Round and reactive Round and reactive    Lens Clear Clear    Vitreous Posterior vitreous detachment Posterior vitreous detachment          Fundus Exam       Right Left    Disc Normal Normal    C/D Ratio 0.3 0.3    Macula Normal Normal    Vessels Normal Normal    Periphery No traction or tear No traction or tear

## 2021-05-06 ENCOUNTER — PATIENT MESSAGE (OUTPATIENT)
Dept: RESEARCH | Facility: HOSPITAL | Age: 66
End: 2021-05-06

## 2021-10-18 ENCOUNTER — TELEPHONE (OUTPATIENT)
Dept: NEUROLOGY | Facility: CLINIC | Age: 66
End: 2021-10-18

## 2021-10-18 ENCOUNTER — PATIENT MESSAGE (OUTPATIENT)
Dept: NEUROLOGY | Facility: CLINIC | Age: 66
End: 2021-10-18
Payer: MEDICARE

## 2021-10-19 ENCOUNTER — OFFICE VISIT (OUTPATIENT)
Dept: NEUROLOGY | Facility: CLINIC | Age: 66
End: 2021-10-19
Payer: MEDICARE

## 2021-10-19 VITALS
SYSTOLIC BLOOD PRESSURE: 98 MMHG | RESPIRATION RATE: 16 BRPM | DIASTOLIC BLOOD PRESSURE: 63 MMHG | HEART RATE: 61 BPM | WEIGHT: 144.75 LBS | BODY MASS INDEX: 26.47 KG/M2

## 2021-10-19 DIAGNOSIS — G45.9 TIA (TRANSIENT ISCHEMIC ATTACK): ICD-10-CM

## 2021-10-19 DIAGNOSIS — Z86.73 HISTORY OF TRANSIENT ISCHEMIC ATTACK (TIA): Primary | ICD-10-CM

## 2021-10-19 PROCEDURE — 3078F PR MOST RECENT DIASTOLIC BLOOD PRESSURE < 80 MM HG: ICD-10-PCS | Mod: CPTII,S$GLB,, | Performed by: NURSE PRACTITIONER

## 2021-10-19 PROCEDURE — 99999 PR PBB SHADOW E&M-EST. PATIENT-LVL IV: CPT | Mod: PBBFAC,,, | Performed by: NURSE PRACTITIONER

## 2021-10-19 PROCEDURE — 99213 PR OFFICE/OUTPT VISIT, EST, LEVL III, 20-29 MIN: ICD-10-PCS | Mod: S$GLB,,, | Performed by: NURSE PRACTITIONER

## 2021-10-19 PROCEDURE — 1101F PT FALLS ASSESS-DOCD LE1/YR: CPT | Mod: CPTII,S$GLB,, | Performed by: NURSE PRACTITIONER

## 2021-10-19 PROCEDURE — 3008F BODY MASS INDEX DOCD: CPT | Mod: CPTII,S$GLB,, | Performed by: NURSE PRACTITIONER

## 2021-10-19 PROCEDURE — 1126F AMNT PAIN NOTED NONE PRSNT: CPT | Mod: CPTII,S$GLB,, | Performed by: NURSE PRACTITIONER

## 2021-10-19 PROCEDURE — 3074F SYST BP LT 130 MM HG: CPT | Mod: CPTII,S$GLB,, | Performed by: NURSE PRACTITIONER

## 2021-10-19 PROCEDURE — 1101F PR PT FALLS ASSESS DOC 0-1 FALLS W/OUT INJ PAST YR: ICD-10-PCS | Mod: CPTII,S$GLB,, | Performed by: NURSE PRACTITIONER

## 2021-10-19 PROCEDURE — 3078F DIAST BP <80 MM HG: CPT | Mod: CPTII,S$GLB,, | Performed by: NURSE PRACTITIONER

## 2021-10-19 PROCEDURE — 3008F PR BODY MASS INDEX (BMI) DOCUMENTED: ICD-10-PCS | Mod: CPTII,S$GLB,, | Performed by: NURSE PRACTITIONER

## 2021-10-19 PROCEDURE — 1159F MED LIST DOCD IN RCRD: CPT | Mod: CPTII,S$GLB,, | Performed by: NURSE PRACTITIONER

## 2021-10-19 PROCEDURE — 99213 OFFICE O/P EST LOW 20 MIN: CPT | Mod: S$GLB,,, | Performed by: NURSE PRACTITIONER

## 2021-10-19 PROCEDURE — 3288F FALL RISK ASSESSMENT DOCD: CPT | Mod: CPTII,S$GLB,, | Performed by: NURSE PRACTITIONER

## 2021-10-19 PROCEDURE — 1159F PR MEDICATION LIST DOCUMENTED IN MEDICAL RECORD: ICD-10-PCS | Mod: CPTII,S$GLB,, | Performed by: NURSE PRACTITIONER

## 2021-10-19 PROCEDURE — 99999 PR PBB SHADOW E&M-EST. PATIENT-LVL IV: ICD-10-PCS | Mod: PBBFAC,,, | Performed by: NURSE PRACTITIONER

## 2021-10-19 PROCEDURE — 3074F PR MOST RECENT SYSTOLIC BLOOD PRESSURE < 130 MM HG: ICD-10-PCS | Mod: CPTII,S$GLB,, | Performed by: NURSE PRACTITIONER

## 2021-10-19 PROCEDURE — 1126F PR PAIN SEVERITY QUANTIFIED, NO PAIN PRESENT: ICD-10-PCS | Mod: CPTII,S$GLB,, | Performed by: NURSE PRACTITIONER

## 2021-10-19 PROCEDURE — 3288F PR FALLS RISK ASSESSMENT DOCUMENTED: ICD-10-PCS | Mod: CPTII,S$GLB,, | Performed by: NURSE PRACTITIONER

## 2021-10-19 RX ORDER — ASPIRIN 81 MG/1
81 TABLET ORAL DAILY
Qty: 365 TABLET | Refills: 0 | Status: SHIPPED | OUTPATIENT
Start: 2021-10-19 | End: 2022-01-03

## 2021-10-20 ENCOUNTER — LAB VISIT (OUTPATIENT)
Dept: LAB | Facility: HOSPITAL | Age: 66
End: 2021-10-20
Attending: NURSE PRACTITIONER
Payer: MEDICARE

## 2021-10-20 DIAGNOSIS — G45.9 TIA (TRANSIENT ISCHEMIC ATTACK): ICD-10-CM

## 2021-10-20 DIAGNOSIS — Z86.73 HISTORY OF TRANSIENT ISCHEMIC ATTACK (TIA): ICD-10-CM

## 2021-10-20 LAB
ALBUMIN SERPL BCP-MCNC: 4.1 G/DL (ref 3.5–5.2)
ALP SERPL-CCNC: 83 U/L (ref 55–135)
ALT SERPL W/O P-5'-P-CCNC: 24 U/L (ref 10–44)
ANION GAP SERPL CALC-SCNC: 9 MMOL/L (ref 8–16)
AST SERPL-CCNC: 26 U/L (ref 10–40)
BASOPHILS # BLD AUTO: 0.01 K/UL (ref 0–0.2)
BASOPHILS NFR BLD: 0.2 % (ref 0–1.9)
BILIRUB SERPL-MCNC: 0.8 MG/DL (ref 0.1–1)
BUN SERPL-MCNC: 17 MG/DL (ref 8–23)
CALCIUM SERPL-MCNC: 9.5 MG/DL (ref 8.7–10.5)
CHLORIDE SERPL-SCNC: 108 MMOL/L (ref 95–110)
CHOLEST SERPL-MCNC: 119 MG/DL (ref 120–199)
CHOLEST/HDLC SERPL: 2.3 {RATIO} (ref 2–5)
CO2 SERPL-SCNC: 28 MMOL/L (ref 23–29)
CREAT SERPL-MCNC: 0.9 MG/DL (ref 0.5–1.4)
DIFFERENTIAL METHOD: ABNORMAL
EOSINOPHIL # BLD AUTO: 0.1 K/UL (ref 0–0.5)
EOSINOPHIL NFR BLD: 1.8 % (ref 0–8)
ERYTHROCYTE [DISTWIDTH] IN BLOOD BY AUTOMATED COUNT: 14 % (ref 11.5–14.5)
EST. GFR  (AFRICAN AMERICAN): >60 ML/MIN/1.73 M^2
EST. GFR  (NON AFRICAN AMERICAN): >60 ML/MIN/1.73 M^2
GLUCOSE SERPL-MCNC: 96 MG/DL (ref 70–110)
HCT VFR BLD AUTO: 40.7 % (ref 37–48.5)
HDLC SERPL-MCNC: 51 MG/DL (ref 40–75)
HDLC SERPL: 42.9 % (ref 20–50)
HGB BLD-MCNC: 13.3 G/DL (ref 12–16)
IMM GRANULOCYTES # BLD AUTO: 0.01 K/UL (ref 0–0.04)
IMM GRANULOCYTES NFR BLD AUTO: 0.2 % (ref 0–0.5)
LDLC SERPL CALC-MCNC: 59 MG/DL (ref 63–159)
LYMPHOCYTES # BLD AUTO: 1.5 K/UL (ref 1–4.8)
LYMPHOCYTES NFR BLD: 34.5 % (ref 18–48)
MCH RBC QN AUTO: 31 PG (ref 27–31)
MCHC RBC AUTO-ENTMCNC: 32.7 G/DL (ref 32–36)
MCV RBC AUTO: 95 FL (ref 82–98)
MONOCYTES # BLD AUTO: 0.5 K/UL (ref 0.3–1)
MONOCYTES NFR BLD: 10.7 % (ref 4–15)
NEUTROPHILS # BLD AUTO: 2.4 K/UL (ref 1.8–7.7)
NEUTROPHILS NFR BLD: 52.6 % (ref 38–73)
NONHDLC SERPL-MCNC: 68 MG/DL
NRBC BLD-RTO: 0 /100 WBC
PLATELET # BLD AUTO: 179 K/UL (ref 150–450)
PMV BLD AUTO: 13 FL (ref 9.2–12.9)
POTASSIUM SERPL-SCNC: 4.4 MMOL/L (ref 3.5–5.1)
PROT SERPL-MCNC: 7.1 G/DL (ref 6–8.4)
RBC # BLD AUTO: 4.29 M/UL (ref 4–5.4)
SODIUM SERPL-SCNC: 145 MMOL/L (ref 136–145)
TRIGL SERPL-MCNC: 45 MG/DL (ref 30–150)
WBC # BLD AUTO: 4.47 K/UL (ref 3.9–12.7)

## 2021-10-20 PROCEDURE — 36415 COLL VENOUS BLD VENIPUNCTURE: CPT | Mod: PO | Performed by: NURSE PRACTITIONER

## 2021-10-20 PROCEDURE — 85025 COMPLETE CBC W/AUTO DIFF WBC: CPT | Performed by: NURSE PRACTITIONER

## 2021-10-20 PROCEDURE — 80061 LIPID PANEL: CPT | Performed by: NURSE PRACTITIONER

## 2021-10-20 PROCEDURE — 80053 COMPREHEN METABOLIC PANEL: CPT | Performed by: NURSE PRACTITIONER

## 2021-11-11 PROBLEM — J38.1 VOCAL CORD POLYP: Status: ACTIVE | Noted: 2021-11-11

## 2021-12-16 ENCOUNTER — OFFICE VISIT (OUTPATIENT)
Dept: OBSTETRICS AND GYNECOLOGY | Facility: CLINIC | Age: 66
End: 2021-12-16
Payer: MEDICARE

## 2021-12-16 VITALS
DIASTOLIC BLOOD PRESSURE: 64 MMHG | BODY MASS INDEX: 26.05 KG/M2 | SYSTOLIC BLOOD PRESSURE: 96 MMHG | WEIGHT: 141.56 LBS | HEIGHT: 62 IN

## 2021-12-16 DIAGNOSIS — Z12.4 CERVICAL CANCER SCREENING: ICD-10-CM

## 2021-12-16 DIAGNOSIS — Z01.411 ENCOUNTER FOR GYNECOLOGICAL EXAMINATION (GENERAL) (ROUTINE) WITH ABNORMAL FINDINGS: Primary | ICD-10-CM

## 2021-12-16 PROCEDURE — 3078F DIAST BP <80 MM HG: CPT | Mod: CPTII,S$GLB,, | Performed by: OBSTETRICS & GYNECOLOGY

## 2021-12-16 PROCEDURE — 1126F PR PAIN SEVERITY QUANTIFIED, NO PAIN PRESENT: ICD-10-PCS | Mod: CPTII,S$GLB,, | Performed by: OBSTETRICS & GYNECOLOGY

## 2021-12-16 PROCEDURE — 3074F PR MOST RECENT SYSTOLIC BLOOD PRESSURE < 130 MM HG: ICD-10-PCS | Mod: CPTII,S$GLB,, | Performed by: OBSTETRICS & GYNECOLOGY

## 2021-12-16 PROCEDURE — 3288F PR FALLS RISK ASSESSMENT DOCUMENTED: ICD-10-PCS | Mod: CPTII,S$GLB,, | Performed by: OBSTETRICS & GYNECOLOGY

## 2021-12-16 PROCEDURE — G0101 CA SCREEN;PELVIC/BREAST EXAM: HCPCS | Mod: S$GLB,,, | Performed by: OBSTETRICS & GYNECOLOGY

## 2021-12-16 PROCEDURE — 3078F PR MOST RECENT DIASTOLIC BLOOD PRESSURE < 80 MM HG: ICD-10-PCS | Mod: CPTII,S$GLB,, | Performed by: OBSTETRICS & GYNECOLOGY

## 2021-12-16 PROCEDURE — 87624 HPV HI-RISK TYP POOLED RSLT: CPT | Performed by: OBSTETRICS & GYNECOLOGY

## 2021-12-16 PROCEDURE — 99999 PR PBB SHADOW E&M-EST. PATIENT-LVL III: ICD-10-PCS | Mod: PBBFAC,,, | Performed by: OBSTETRICS & GYNECOLOGY

## 2021-12-16 PROCEDURE — 3288F FALL RISK ASSESSMENT DOCD: CPT | Mod: CPTII,S$GLB,, | Performed by: OBSTETRICS & GYNECOLOGY

## 2021-12-16 PROCEDURE — 1126F AMNT PAIN NOTED NONE PRSNT: CPT | Mod: CPTII,S$GLB,, | Performed by: OBSTETRICS & GYNECOLOGY

## 2021-12-16 PROCEDURE — 99999 PR PBB SHADOW E&M-EST. PATIENT-LVL III: CPT | Mod: PBBFAC,,, | Performed by: OBSTETRICS & GYNECOLOGY

## 2021-12-16 PROCEDURE — G0101 PR CA SCREEN;PELVIC/BREAST EXAM: ICD-10-PCS | Mod: S$GLB,,, | Performed by: OBSTETRICS & GYNECOLOGY

## 2021-12-16 PROCEDURE — 3074F SYST BP LT 130 MM HG: CPT | Mod: CPTII,S$GLB,, | Performed by: OBSTETRICS & GYNECOLOGY

## 2021-12-16 PROCEDURE — 1101F PR PT FALLS ASSESS DOC 0-1 FALLS W/OUT INJ PAST YR: ICD-10-PCS | Mod: CPTII,S$GLB,, | Performed by: OBSTETRICS & GYNECOLOGY

## 2021-12-16 PROCEDURE — 3008F BODY MASS INDEX DOCD: CPT | Mod: CPTII,S$GLB,, | Performed by: OBSTETRICS & GYNECOLOGY

## 2021-12-16 PROCEDURE — 1101F PT FALLS ASSESS-DOCD LE1/YR: CPT | Mod: CPTII,S$GLB,, | Performed by: OBSTETRICS & GYNECOLOGY

## 2021-12-16 PROCEDURE — 3008F PR BODY MASS INDEX (BMI) DOCUMENTED: ICD-10-PCS | Mod: CPTII,S$GLB,, | Performed by: OBSTETRICS & GYNECOLOGY

## 2021-12-21 LAB
CYTOLOGIST CVX/VAG CYTO: ABNORMAL
CYTOLOGY CVX/VAG DOC CYTO: ABNORMAL
CYTOLOGY CVX/VAG DOC THIN PREP: ABNORMAL
CYTOLOGY THINPREP PAP COMMENT: ABNORMAL
HPV HR 12 DNA CVX QL NAA+PROBE: NEGATIVE
HPV16 DNA CVX QL NAA+PROBE: POSITIVE
HPV18 DNA CVX QL NAA+PROBE: NEGATIVE
PAP NOTE: ABNORMAL
STAT OF ADQ CVX/VAG CYTO-IMP: ABNORMAL

## 2021-12-23 ENCOUNTER — PATIENT MESSAGE (OUTPATIENT)
Dept: OBSTETRICS AND GYNECOLOGY | Facility: CLINIC | Age: 66
End: 2021-12-23
Payer: MEDICARE

## 2021-12-23 ENCOUNTER — TELEPHONE (OUTPATIENT)
Dept: NEUROLOGY | Facility: CLINIC | Age: 66
End: 2021-12-23
Payer: MEDICARE

## 2021-12-23 PROBLEM — R20.0 LEFT SIDED NUMBNESS: Status: ACTIVE | Noted: 2021-12-23

## 2021-12-24 ENCOUNTER — PATIENT MESSAGE (OUTPATIENT)
Dept: NEUROLOGY | Facility: CLINIC | Age: 66
End: 2021-12-24
Payer: MEDICARE

## 2022-01-11 ENCOUNTER — PATIENT MESSAGE (OUTPATIENT)
Dept: NEUROLOGY | Facility: CLINIC | Age: 67
End: 2022-01-11

## 2022-01-11 ENCOUNTER — OFFICE VISIT (OUTPATIENT)
Dept: NEUROLOGY | Facility: CLINIC | Age: 67
End: 2022-01-11
Payer: MEDICARE

## 2022-01-11 DIAGNOSIS — Z86.73 HISTORY OF TRANSIENT ISCHEMIC ATTACK (TIA): ICD-10-CM

## 2022-01-11 DIAGNOSIS — R20.2 NUMBNESS AND TINGLING IN LEFT ARM: ICD-10-CM

## 2022-01-11 DIAGNOSIS — R20.0 NUMBNESS AND TINGLING IN LEFT ARM: ICD-10-CM

## 2022-01-11 PROCEDURE — 1159F MED LIST DOCD IN RCRD: CPT | Mod: CPTII,95,, | Performed by: NURSE PRACTITIONER

## 2022-01-11 PROCEDURE — 1160F RVW MEDS BY RX/DR IN RCRD: CPT | Mod: CPTII,95,, | Performed by: NURSE PRACTITIONER

## 2022-01-11 PROCEDURE — 99214 PR OFFICE/OUTPT VISIT, EST, LEVL IV, 30-39 MIN: ICD-10-PCS | Mod: 95,,, | Performed by: NURSE PRACTITIONER

## 2022-01-11 PROCEDURE — 99214 OFFICE O/P EST MOD 30 MIN: CPT | Mod: 95,,, | Performed by: NURSE PRACTITIONER

## 2022-01-11 PROCEDURE — 1159F PR MEDICATION LIST DOCUMENTED IN MEDICAL RECORD: ICD-10-PCS | Mod: CPTII,95,, | Performed by: NURSE PRACTITIONER

## 2022-01-11 PROCEDURE — 1160F PR REVIEW ALL MEDS BY PRESCRIBER/CLIN PHARMACIST DOCUMENTED: ICD-10-PCS | Mod: CPTII,95,, | Performed by: NURSE PRACTITIONER

## 2022-01-11 NOTE — PROGRESS NOTES
NEUROLOGY  Outpatient Follow Up    [facility]  [address] [facility] [address], [place] [address]  [phone] (office) / [phone] (fax)    Patient Name:  Chance Napier  :  1955  MR #:  [id number]  Acct #:  [id number]    Date of Neurology Visit: 2022  Other Physicians:  Radha Browning MD (Primary Care Physician); No ref. provider found (Referring)      The patient location is: [place], [place]  The chief complaint leading to consultation is: L arm numbness    Visit type: audiovisual    Face to Face time with patient: 07:59-8:20    Each patient to whom he or she provides medical services by telemedicine is:  (1) informed of the relationship between the physician and patient and the respective role of any other health care provider with respect to management of the patient; and (2) notified that he or she may decline to receive medical services by telemedicine and may withdraw from such care at any time.    Interval history  22:  Chance Napier is a 65 y.o. female seen in hospital follow up for possible TIA with L hand numbness.     Her MH is otherwise significant for TIA and HLD    For years, she has noted that she would wake in early AM with numbness in either hand that would resolve with shaking her hands. It seemed to depend on her sleep position. On , she woke at 3 am and the L arm felt dead and heavy. It didn't feel like the typical pins and needles. The dead sensation involved the whole L hand and extended up to the elbow. She moved it, but it never felt like it woke up. She held her arms in front of her and noted that the 3rd and 4th fingers wouldn't stay up. She did not have any facial droop, speech changes, vision changes or weakness. She went to the ED for evaluation. The brain MRI was negative. She was given a wrist brace and discharged. Her symptoms started to improve when she put the brace on.     Sine discharge, she has been sleeping in the brace. The  brace mainly reminds her to sleep on her back and not her side. She hasn't had any recurrence of symptoms. She still notes some difficulty with the 3rd and 4th digits when she is typing. She no longer works and doesn't have to type much. She denies repetitive elbow flexion on a regular basis. She exercises, but mainly does jazzercise. She denies neck pain. No radiating type pains from the neck into the arm or hand. A friend who is a PT gave her some putty to do hand exercises with and this has also been helpful.     She continues on the same ASA 81 mg daily and Lipitor 10 mg daily for stroke prevention.     PRIOR HISTORY:  10/19/21:  Chance Napier is a 65 y.o. female seen in follow up for history of TIA.     Here for annual follow up. Not having any issues, recurrence of stroke/TIA symptoms. She is wondering if she needs to continue the high dose ASA and the Lipitor. She doesn't like to take prescription medications if she doesn't have to. Not having any bleeding / gastritis issues. Not having any myalgias. Hasn't had labs updated in about 1 year.     Drinks a lot of milk, but uses 2% variety. Tries to avoid red meat, eats mostly seafood and poultry. Limits breads. Retired, stays active working in her yard.     Unsure of family history regarding CV disease, as she is adopted.    States that BP typically runs in the low 100s/70s at home.     Still occasionally wakes with tingling and numbness in the R hand at night. Shakes it out for relief, then back to sleep. No sx during the day. Not bothersome to her. Hasn't tried bracing.     No other major changes to her medical history.     Prior history:  4/2020:  She is new to me today. She denies any acute complaints today. She experienced some episodic dizziness last week, but this has since resolved. She has chronic cerumen buildup in the R ear and was also having some sinus issues at the time, so she attributed her dizziness to that. She was started on low dose  "Lipitor at her last visit with Dr. Taylor and is tolerating it well. She denies muscle aches/pain. She states that she has gained a little bit of weight despite diet and exercise and wonders if this is r/t Lipitor. Had her annual wellness visit with Dr. Browning in November with repeat labs - see below. She does endorse that every now and then she can hear a "creaking" sound in her neck, which is new. She denies neck pain, neck stiffness, arm weakness. She also describes occasional tingling type pain in both hands that occurs infrequently at night and improves with movement. These sensations are not bothersome enough to her to warrant any further workup, however. She reportedly had a TTE done years ago which she states was normal, but we do not have these records. She is not sure if she had a bubble study. States that the TTE was done on the [place], but she doesn't remember where.   Reviewed imaging and labs that she had done since her last in person visit -- nothing of concern noted. She continues on ASA 325mg for stroke prevention as well.     From last visit with Dr. Taylor 3/2019:  In October 2017, she was having lunch with friends and she had an overwhelming feeling of feeling drunk. She denies any vertigo or nausea but rather this was a fuzzy feeling in her head. She had slurred speech and numbness in her right hand. Her right leg was weak as well. She felt like she was going to fall when she stood up due to right leg weakness. Her friends initially brought her home but then decided to bring her to the hospital. Her friends brought her to the ER. The symptoms lasted about 15 minutes and resolved by the time she got to the ER. She stayed overnight.   MRI brain was normal. Carotid US was normal. Echocardiogram was done outpatient and reportedly normal as well (not in the [facility] system). She was already taking aspirin 81 mg daily when this happened so she increased to 325 mg daily and has been on that since " then. She was only taking baby aspirin daily as a preventative measure.   She had lipid panel in Nov 2018 with LDL 88. Not on a statin. She is a nonsmoker.  When she was about [age], she had numbness on her left side. She was on birth control pills at the time. This resolved after about 1 day.  No headache with this. She was told this was due to birth control and told to stop birth control. No other recurrence of these symptoms until Oct 2017.   She has noticed that she has felt her pulse more in her neck bilaterally lately.   She was adopted so doesn't know if family history of blood clots or strokes.     Past Medical, Surgical, Family & Social History:   Reviewed and updated.    Home Medications:     Current Outpatient Medications:     ascorbic acid, vitamin C, (VITAMIN C) 1000 MG tablet, Take 1,000 mg by mouth once daily., Disp: , Rfl:     aspirin (ECOTRIN) 81 MG EC tablet, TAKE 1 TABLET EVERY DAY, Disp: 90 tablet, Rfl: 3    atorvastatin (LIPITOR) 10 MG tablet, Take 1 tablet (10 mg total) by mouth once daily., Disp: 90 tablet, Rfl: 3    fluticasone (FLONASE) 50 mcg/actuation nasal spray, 1 spray by Each Nare route once daily. 2 in left nare, 1 in right, Disp: , Rfl:     ipratropium (ATROVENT) 21 mcg (0.03 %) nasal spray, USE 2 SPRAYS IN EACH NOSTRIL 3 TO 4 TIMES PER DAY, Disp: , Rfl:     multivitamin (THERAGRAN) per tablet, Take 1 tablet by mouth once daily., Disp: , Rfl:     Physical Examination:  There were no vitals taken for this visit.     General: Well groomed. No acute distress.  Pulmonary: Normal effort and rate.   .   Neurological exam:  Mental status: Awake and alert.  Oriented to person, place, time and situation. Recent and remote memory appear to be intact.   Speech/Language: Fluent and appropriate. No dysarthria or aphasia on conversation. Able to follow complex commands.   Cranial nerves (II-XII): Extraocular movements intact, no obvious ptosis. Face symmetric. Shoulder shrug normal  bilaterally. Normal tongue protrusion.   Motor: No pronator drift. Able to  coffee mug with both hands without difficulty. Able to demonstrate bilateral finger flexion and extension against opposition without subjective change in strength.   Sensation: Intact to light touch and pinprick throughout L hand and forearm on self testing   Coordination: no tremor noted     Diagnostic Data Reviewed:  Imaging:  MRI brain 12/23/21: negative     CT C spine 12/23/21:  FINDINGS:  There is degenerative anterolisthesis of C4 on C5.  There is multilevel disc space narrowing, uncovertebral osteophytosis, and facet arthropathy.  There is no fracture.  The included portions of the posterior fossa are normal.  The craniocervical junction is symmetric.  The atlantoaxial articulation is normal.     The airway is patent.  There is no cervical lymphadenopathy.  The thyroid gland is normal.  The bilateral lung apices are clear.     Impression:  Multilevel degenerative change with out posttraumatic abnormality of the cervical spine    CTA head and neck 3/2019:  No obvious aneurysm or stenosis is noted involving the [id number], middle, posterior, or anterior cerebral arteries.  The internal carotid arteries appear patent bilaterally without significant stenosis.  Incidental note is made of an empty sella configuration to the pituitary  Degenerative changes are noted within the spine    Labs:  Component      Latest Ref Rng & Units 12/23/2021 10/20/2021   WBC      3.90 - 12.70 K/uL 4.52    RBC      4.00 - 5.40 M/uL 4.33    Hemoglobin      12.0 - 16.0 g/dL 13.2    Hematocrit      37.0 - 48.5 % 38.8    MCV      82 - 98 fL 90    MCH      27.0 - 31.0 pg 30.5    MCHC      32.0 - 36.0 g/dL 34.0    RDW      11.5 - 14.5 % 14.2    Platelets      150 - 450 K/uL 174    MPV      9.2 - 12.9 fL 12.0    Immature Granulocytes      0.0 - 0.5 % 0.2    Gran # (ANC)      1.8 - 7.7 K/uL 2.1    Immature Grans (Abs)      0.00 - 0.04 K/uL 0.01    Lymph #       1.0 - 4.8 K/uL 1.9    Mono #      0.3 - 1.0 K/uL 0.4    Eos #      0.0 - 0.5 K/uL 0.1    Baso #      0.00 - 0.20 K/uL 0.01    nRBC      0 /100 WBC 0    Gran %      38.0 - 73.0 % 46.6    Lymph %      18.0 - 48.0 % 41.8    Mono %      4.0 - 15.0 % 8.8    Eosinophil %      0.0 - 8.0 % 2.4    Basophil %      0.0 - 1.9 % 0.2    Differential Method       Automated    Sodium      136 - 145 mmol/L 143    Potassium      3.5 - 5.1 mmol/L 4.0    Chloride      95 - 110 mmol/L 109    CO2      22 - 31 mmol/L 27    Glucose      70 - 110 mg/dL 89    BUN      7 - 18 mg/dL 18    Creatinine      0.50 - 1.40 mg/dL 0.75    Calcium      8.4 - 10.2 mg/dL 9.3    PROTEIN TOTAL      6.0 - 8.4 g/dL 7.2    Albumin      3.5 - 5.2 g/dL 4.2    BILIRUBIN TOTAL      0.2 - 1.3 mg/dL 0.5    Alkaline Phosphatase      38 - 145 U/L 73    AST      14 - 36 U/L 43 (H)    ALT      0 - 35 U/L 36 (H)    Anion Gap      8 - 16 mmol/L 7 (L)    eGFR if African American      >60 mL/min/1.73 m:2 >60    eGFR if non African American      >60 mL/min/1.73 m:2 >60    Cholesterol      120 - 199 mg/dL  119 (L)   Triglycerides      30 - 150 mg/dL  45   HDL      40 - 75 mg/dL  51   LDL Cholesterol External      63.0 - 159.0 mg/dL  59.0 (L)   HDL/Cholesterol Ratio      20.0 - 50.0 %  42.9   Total Cholesterol/HDL Ratio      2.0 - 5.0  2.3   Non-HDL Cholesterol      mg/dL  68     hypercoag panel negative     Assessment and Plan:  Problem List Items Addressed This Visit        Neuro    History of transient ischemic attack (TIA)    Overview     Imaging: MRI 10/2017, CUS 10/2017, CTA head and neck 3/2019  Risk factors: HLD, adopted - unknown FH             Current Assessment & Plan     Do not suspect that recent event involving L arm and hand numbness was vascular in nature  Continue same ASA 81 mg and Lipitor 10 mg for stroke prevention            Other    Numbness and tingling in left arm    Current Assessment & Plan     Involving mainly the 3rd and 4th digits but extending  up to the elbow, suspicious for ulnar neuropathy   Alternatively, could represent C7-8 radiculopathy, but denies typical radicular symptoms or neck pain   Subjectively notes some mild motor dysfunction in the digits mainly with typing  Improved with conservative measures - hand exercises, wrist brace (argues for entrapment at wrist level rather than elbow)  Discussed options -- diagnostic EMG for planning vs. Continued conservative measures only              Follow Up: PRN    I spent a total of 35 minutes on the day of the visit.  This includes face to face time and non-face to face time preparing to see the patient (eg, review of tests), obtaining and/or reviewing separately obtained history, documenting clinical information in the electronic or other health record, independently interpreting results and communicating results to the patient/family/caregiver, or care coordinator.    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         Faviola Perez, [facility]

## 2022-01-11 NOTE — ASSESSMENT & PLAN NOTE
Involving mainly the 3rd and 4th digits but extending up to the elbow, suspicious for ulnar neuropathy   Alternatively, could represent C7-8 radiculopathy, but denies typical radicular symptoms or neck pain   Subjectively notes some mild motor dysfunction in the digits mainly with typing  Improved with conservative measures - hand exercises, wrist brace (argues for entrapment at wrist level rather than elbow)  Discussed options -- diagnostic EMG for planning vs. Continued conservative measures only

## 2022-01-11 NOTE — PATIENT INSTRUCTIONS
I do not think that the numbness in the left arm was a TIA, fortunately. Continue the same medications (aspirin 81 mg and Lipitor 10 mg) to prevent future stroke/TIA. Follow with Dr. Browning for monitoring of your cholesterol levels -- we want to keep the LDL < 70.     The numbness in the L arm and hand sounds more like ulnar neuropathy. The only way to truly diagnose this is with a test called an EMG or nerve conduction study. This helps us identify if there is active nerve damage, where exactly it is located and how bad it is. Given that you do not have significant hand weakness and the symptoms have nearly resolved with using the wrist brace, it would be reasonable to continue to manage it conservatively by just using the brace. If you develop any worsening symptoms (recurrent numbness, weakness in the fingers or hand), I would definitely recommend that we move forward with the test.

## 2022-01-11 NOTE — ASSESSMENT & PLAN NOTE
Do not suspect that recent event involving L arm and hand numbness was vascular in nature  Continue same ASA 81 mg and Lipitor 10 mg for stroke prevention

## 2022-01-12 ENCOUNTER — OFFICE VISIT (OUTPATIENT)
Dept: OBSTETRICS AND GYNECOLOGY | Facility: CLINIC | Age: 67
End: 2022-01-12
Payer: MEDICARE

## 2022-01-12 VITALS
WEIGHT: 140.19 LBS | SYSTOLIC BLOOD PRESSURE: 102 MMHG | BODY MASS INDEX: 25.8 KG/M2 | DIASTOLIC BLOOD PRESSURE: 70 MMHG | HEIGHT: 62 IN

## 2022-01-12 DIAGNOSIS — B97.7 HIGH RISK HPV INFECTION: Primary | ICD-10-CM

## 2022-01-12 PROCEDURE — 57454 BX/CURETT OF CERVIX W/SCOPE: CPT | Mod: S$GLB,,, | Performed by: OBSTETRICS & GYNECOLOGY

## 2022-01-12 PROCEDURE — 99499 UNLISTED E&M SERVICE: CPT | Mod: S$GLB,,, | Performed by: OBSTETRICS & GYNECOLOGY

## 2022-01-12 PROCEDURE — 88305 TISSUE EXAM BY PATHOLOGIST: CPT | Performed by: PATHOLOGY

## 2022-01-12 PROCEDURE — 99999 PR PBB SHADOW E&M-EST. PATIENT-LVL II: CPT | Mod: PBBFAC,,, | Performed by: OBSTETRICS & GYNECOLOGY

## 2022-01-12 PROCEDURE — 99999 PR PBB SHADOW E&M-EST. PATIENT-LVL II: ICD-10-PCS | Mod: PBBFAC,,, | Performed by: OBSTETRICS & GYNECOLOGY

## 2022-01-12 PROCEDURE — 3078F PR MOST RECENT DIASTOLIC BLOOD PRESSURE < 80 MM HG: ICD-10-PCS | Mod: CPTII,S$GLB,, | Performed by: OBSTETRICS & GYNECOLOGY

## 2022-01-12 PROCEDURE — 3078F DIAST BP <80 MM HG: CPT | Mod: CPTII,S$GLB,, | Performed by: OBSTETRICS & GYNECOLOGY

## 2022-01-12 PROCEDURE — 88305 TISSUE EXAM BY PATHOLOGIST: ICD-10-PCS | Mod: 26,,, | Performed by: PATHOLOGY

## 2022-01-12 PROCEDURE — 57454 COLPOSCOPY: ICD-10-PCS | Mod: S$GLB,,, | Performed by: OBSTETRICS & GYNECOLOGY

## 2022-01-12 PROCEDURE — 3008F BODY MASS INDEX DOCD: CPT | Mod: CPTII,S$GLB,, | Performed by: OBSTETRICS & GYNECOLOGY

## 2022-01-12 PROCEDURE — 3074F PR MOST RECENT SYSTOLIC BLOOD PRESSURE < 130 MM HG: ICD-10-PCS | Mod: CPTII,S$GLB,, | Performed by: OBSTETRICS & GYNECOLOGY

## 2022-01-12 PROCEDURE — 1126F AMNT PAIN NOTED NONE PRSNT: CPT | Mod: CPTII,S$GLB,, | Performed by: OBSTETRICS & GYNECOLOGY

## 2022-01-12 PROCEDURE — 3074F SYST BP LT 130 MM HG: CPT | Mod: CPTII,S$GLB,, | Performed by: OBSTETRICS & GYNECOLOGY

## 2022-01-12 PROCEDURE — 99499 NO LOS: ICD-10-PCS | Mod: S$GLB,,, | Performed by: OBSTETRICS & GYNECOLOGY

## 2022-01-12 PROCEDURE — 1126F PR PAIN SEVERITY QUANTIFIED, NO PAIN PRESENT: ICD-10-PCS | Mod: CPTII,S$GLB,, | Performed by: OBSTETRICS & GYNECOLOGY

## 2022-01-12 PROCEDURE — 88305 TISSUE EXAM BY PATHOLOGIST: CPT | Mod: 26,,, | Performed by: PATHOLOGY

## 2022-01-12 PROCEDURE — 3008F PR BODY MASS INDEX (BMI) DOCUMENTED: ICD-10-PCS | Mod: CPTII,S$GLB,, | Performed by: OBSTETRICS & GYNECOLOGY

## 2022-01-17 NOTE — PROCEDURES
Colposcopy    Date/Time: 1/12/2022 11:20 AM  Performed by: Traci Olson MD  Authorized by: Traci Olson MD     Consent Done?:  Yes (Verbal)  Timeout:Immediately prior to procedure a time out was called to verify the correct patient, procedure, equipment, support staff and site/side marked as required    Colposcopy Site:  Cervix   Patient was prepped and draped in the normal sterile fashion.  Acrowhite Lesion? Yes    Transformation Zone Adequate?: No    Biopsy?: Yes         Location:  Cervix ((1 00))  ECC Performed?: Yes    LEEP Performed?: No     Patient tolerated the procedure well with no immediate complications.   Post-operative instructions were provided for the patient.   Patient was discharged and will follow up if any complications occur     PRE-COLPOSCOPY PROCEDURE COUNSELING:  Discussed the abnormal pap test findings, HPV, need for colposcopy and possible biopsies to determine a diagnosis and plan of care, treatments available, the minimal risks of bleeding and infection with a colposcopy, alternatives to colposcopy and she agrees to proceed.    Procedure:  Speculum was placed. Cervix completely visualized. Transformation zone clearly visualized. Green filter activated: vascular abnormalities: not seen  Green filter disengaged and acetic acid applied in the usual fashion:  AcetoWhite epithelium seen.  Mosaic pattern not seen.  Biopsy performed.  ECC done.    Monsel's solution applied to biopsy site for hemostasis and tolerated well.   The speculum was removed.  The patient tolerated the procedure well.    POST COLPOSCOPY COUNSELING:   Manage post colposcopy cramping with NSAIDs, Tylenol or Rx per [employer].  Avoid anything in vagina (intercourse, douching, tampons) one week after the procedure.  Expect a clumpy blackish vaginal discharge (Monsel's solution) for several days.   Report bleeding heavier than a period, worsening pain, fever > 101.0 F, or foul-smelling vaginal discharge.  HPV vaccine  recommended according to FDA age guidelines.  Importance of follow-up stressed.    Assessment:  High risk HPV infection  (primary encounter diagnosis)  Plan: Specimen to Pathology, Ob/Gyn      Plan:  Biopsy performed.  ECC done

## 2022-01-21 ENCOUNTER — PATIENT MESSAGE (OUTPATIENT)
Dept: OBSTETRICS AND GYNECOLOGY | Facility: CLINIC | Age: 67
End: 2022-01-21
Payer: MEDICARE

## 2022-01-21 LAB
FINAL PATHOLOGIC DIAGNOSIS: NORMAL
GROSS: NORMAL
Lab: NORMAL

## 2022-01-25 ENCOUNTER — OFFICE VISIT (OUTPATIENT)
Dept: OBSTETRICS AND GYNECOLOGY | Facility: CLINIC | Age: 67
End: 2022-01-25
Payer: MEDICARE

## 2022-01-25 DIAGNOSIS — N87.1 HIGH GRADE SQUAMOUS INTRAEPITHELIAL LESION (HGSIL), GRADE 2 CIN, ON BIOPSY OF CERVIX: Primary | ICD-10-CM

## 2022-01-25 PROCEDURE — 99213 PR OFFICE/OUTPT VISIT, EST, LEVL III, 20-29 MIN: ICD-10-PCS | Mod: 95,,, | Performed by: OBSTETRICS & GYNECOLOGY

## 2022-01-25 PROCEDURE — 99213 OFFICE O/P EST LOW 20 MIN: CPT | Mod: 95,,, | Performed by: OBSTETRICS & GYNECOLOGY

## 2022-01-25 NOTE — PROGRESS NOTES
The patient location is: [place]  The chief complaint leading to consultation is: Results    Visit type: audiovisual    Face to Face time with patient:   20 minutes of total time spent on the encounter, which includes face to face time and non-face to face time preparing to see the patient (eg, review of tests), Obtaining and/or reviewing separately obtained history, Documenting clinical information in the electronic or other health record, Independently interpreting results (not separately reported) and communicating results to the patient/family/caregiver, or Care coordination (not separately reported).     Each patient to whom he or she provides medical services by telemedicine is:  (1) informed of the relationship between the physician and patient and the respective role of any other health care provider with respect to management of the patient; and (2) notified that he or she may decline to receive medical services by telemedicine and may withdraw from such care at any time.    Notes:     History of Present Illness   66 y.o. F patient presents today for results from her colposopcy    Pap: NILM & HPV 16    Colpo:   1. ENDOCERVICAL CURETTAGE SHOWS SCANT FRAGMENTS OF BENIGN ENDOCERVICAL   GLANDULAR EPITHELIUM AND SMALL DETACHED FRAGMENTS OF SQUAMOUS EPITHELIUM WITH   NO DYSPLASIA IDENTIFIED.   2. CERVICAL BIOPSY AT 1 O'CLOCK SHOWS MILD-TO-MODERATE SQUAMOUS DYSPLASIA   (SUNDAY 1-2).     Past medical and surgical history reviewed.   I have reviewed the patient's medical history in detail and updated the computerized patient record.  Review of patient's allergies indicates:  No Known Allergies    Review of Systems  Constitutional: negative for chills and fatigue  Gastrointestinal: negative for abdominal pain, constipation, diarrhea, nausea and vomiting  Genitourinary:negative for abnormal menstrual periods, genital lesions and vaginal discharge, dysuria, frequency and hematuria    Physical Examination:  There were no  vitals taken for this visit.   Physical Exam   Virtual visit    Assessment/Plan:  High grade squamous intraepithelial lesion (HGSIL), grade 2 SUNDAY, on biopsy of cervix      Recommended Dx Excisional procedure

## 2022-01-25 NOTE — Clinical Note
Please schedule:  Procedure: Cold Knife Cone Dx: N87.1 SUNDAY 2 CPT: 27461 Inpatient vs Outpatient: Outpatient Surgery Location: [facility] Assistant: none Book on: 2/18 to follow

## 2022-01-26 DIAGNOSIS — N87.1 CIN II (CERVICAL INTRAEPITHELIAL NEOPLASIA II): Primary | ICD-10-CM

## 2022-02-14 ENCOUNTER — OFFICE VISIT (OUTPATIENT)
Dept: OBSTETRICS AND GYNECOLOGY | Facility: CLINIC | Age: 67
End: 2022-02-14
Payer: MEDICARE

## 2022-02-14 VITALS
SYSTOLIC BLOOD PRESSURE: 118 MMHG | BODY MASS INDEX: 26.69 KG/M2 | HEIGHT: 62 IN | WEIGHT: 145.06 LBS | DIASTOLIC BLOOD PRESSURE: 74 MMHG

## 2022-02-14 DIAGNOSIS — N87.1 CIN II (CERVICAL INTRAEPITHELIAL NEOPLASIA II): ICD-10-CM

## 2022-02-14 DIAGNOSIS — N87.9 CERVICAL DYSPLASIA: ICD-10-CM

## 2022-02-14 DIAGNOSIS — Z01.818 PREOP TESTING: Primary | ICD-10-CM

## 2022-02-14 PROCEDURE — 3074F PR MOST RECENT SYSTOLIC BLOOD PRESSURE < 130 MM HG: ICD-10-PCS | Mod: CPTII,S$GLB,, | Performed by: OBSTETRICS & GYNECOLOGY

## 2022-02-14 PROCEDURE — 99999 PR PBB SHADOW E&M-EST. PATIENT-LVL III: ICD-10-PCS | Mod: PBBFAC,,, | Performed by: OBSTETRICS & GYNECOLOGY

## 2022-02-14 PROCEDURE — 1159F MED LIST DOCD IN RCRD: CPT | Mod: CPTII,S$GLB,, | Performed by: OBSTETRICS & GYNECOLOGY

## 2022-02-14 PROCEDURE — 1126F AMNT PAIN NOTED NONE PRSNT: CPT | Mod: CPTII,S$GLB,, | Performed by: OBSTETRICS & GYNECOLOGY

## 2022-02-14 PROCEDURE — 3288F PR FALLS RISK ASSESSMENT DOCUMENTED: ICD-10-PCS | Mod: CPTII,S$GLB,, | Performed by: OBSTETRICS & GYNECOLOGY

## 2022-02-14 PROCEDURE — 99999 PR PBB SHADOW E&M-EST. PATIENT-LVL III: CPT | Mod: PBBFAC,,, | Performed by: OBSTETRICS & GYNECOLOGY

## 2022-02-14 PROCEDURE — 1101F PT FALLS ASSESS-DOCD LE1/YR: CPT | Mod: CPTII,S$GLB,, | Performed by: OBSTETRICS & GYNECOLOGY

## 2022-02-14 PROCEDURE — 3008F BODY MASS INDEX DOCD: CPT | Mod: CPTII,S$GLB,, | Performed by: OBSTETRICS & GYNECOLOGY

## 2022-02-14 PROCEDURE — 3078F DIAST BP <80 MM HG: CPT | Mod: CPTII,S$GLB,, | Performed by: OBSTETRICS & GYNECOLOGY

## 2022-02-14 PROCEDURE — 1101F PR PT FALLS ASSESS DOC 0-1 FALLS W/OUT INJ PAST YR: ICD-10-PCS | Mod: CPTII,S$GLB,, | Performed by: OBSTETRICS & GYNECOLOGY

## 2022-02-14 PROCEDURE — 3078F PR MOST RECENT DIASTOLIC BLOOD PRESSURE < 80 MM HG: ICD-10-PCS | Mod: CPTII,S$GLB,, | Performed by: OBSTETRICS & GYNECOLOGY

## 2022-02-14 PROCEDURE — 3074F SYST BP LT 130 MM HG: CPT | Mod: CPTII,S$GLB,, | Performed by: OBSTETRICS & GYNECOLOGY

## 2022-02-14 PROCEDURE — 99499 UNLISTED E&M SERVICE: CPT | Mod: S$GLB,,, | Performed by: OBSTETRICS & GYNECOLOGY

## 2022-02-14 PROCEDURE — 99499 NO LOS: ICD-10-PCS | Mod: S$GLB,,, | Performed by: OBSTETRICS & GYNECOLOGY

## 2022-02-14 PROCEDURE — 1160F PR REVIEW ALL MEDS BY PRESCRIBER/CLIN PHARMACIST DOCUMENTED: ICD-10-PCS | Mod: CPTII,S$GLB,, | Performed by: OBSTETRICS & GYNECOLOGY

## 2022-02-14 PROCEDURE — 1160F RVW MEDS BY RX/DR IN RCRD: CPT | Mod: CPTII,S$GLB,, | Performed by: OBSTETRICS & GYNECOLOGY

## 2022-02-14 PROCEDURE — 1159F PR MEDICATION LIST DOCUMENTED IN MEDICAL RECORD: ICD-10-PCS | Mod: CPTII,S$GLB,, | Performed by: OBSTETRICS & GYNECOLOGY

## 2022-02-14 PROCEDURE — 3288F FALL RISK ASSESSMENT DOCD: CPT | Mod: CPTII,S$GLB,, | Performed by: OBSTETRICS & GYNECOLOGY

## 2022-02-14 PROCEDURE — 1126F PR PAIN SEVERITY QUANTIFIED, NO PAIN PRESENT: ICD-10-PCS | Mod: CPTII,S$GLB,, | Performed by: OBSTETRICS & GYNECOLOGY

## 2022-02-14 PROCEDURE — 3008F PR BODY MASS INDEX (BMI) DOCUMENTED: ICD-10-PCS | Mod: CPTII,S$GLB,, | Performed by: OBSTETRICS & GYNECOLOGY

## 2022-02-14 RX ORDER — MUPIROCIN 20 MG/G
OINTMENT TOPICAL
Status: CANCELLED | OUTPATIENT
Start: 2022-02-14

## 2022-02-14 RX ORDER — SODIUM CHLORIDE 9 MG/ML
INJECTION, SOLUTION INTRAVENOUS CONTINUOUS
Status: CANCELLED | OUTPATIENT
Start: 2022-02-14

## 2022-02-14 NOTE — PROGRESS NOTES
66 y.o.  presents for pre-op H&P for CKC for cervical dysplaisa.  No LMP recorded. Patient is postmenopausal..      2021 pap: HPV16+ and pap NILM  Colpo:   1. ENDOCERVICAL CURETTAGE SHOWS SCANT FRAGMENTS OF BENIGN ENDOCERVICAL   GLANDULAR EPITHELIUM AND SMALL DETACHED FRAGMENTS OF SQUAMOUS EPITHELIUM WITH   NO DYSPLASIA IDENTIFIED.   2. CERVICAL BIOPSY AT 1 O'CLOCK SHOWS MILD-TO-MODERATE SQUAMOUS DYSPLASIA   (SUNDAY 1-2).    Past Medical History:   Diagnosis Date    AR (allergic rhinitis)     Polyp, vocal cord 2021    Stroke 10/14/2017    TIA    TIA (transient ischemic attack)      Past Surgical History:   Procedure Laterality Date    COLONOSCOPY  2018    Dr. Gonzalez    EYE SURGERY      TONSILLECTOMY      TUBAL LIGATION      WISDOM TOOTH EXTRACTION       Family History   Adopted: Yes   Problem Relation Age of Onset    Breast cancer Neg Hx     Ovarian cancer Neg Hx     Colon cancer Neg Hx      Review of patient's allergies indicates:  No Known Allergies    Current Outpatient Medications:     ascorbic acid, vitamin C, (VITAMIN C) 1000 MG tablet, Take 1,000 mg by mouth once daily., Disp: , Rfl:     aspirin (ECOTRIN) 81 MG EC tablet, TAKE 1 TABLET EVERY DAY, Disp: 90 tablet, Rfl: 3    atorvastatin (LIPITOR) 10 MG tablet, Take 1 tablet (10 mg total) by mouth once daily., Disp: 90 tablet, Rfl: 3    fluticasone (FLONASE) 50 mcg/actuation nasal spray, 1 spray by Each Nare route once daily. 2 in left nare, 1 in right, Disp: , Rfl:     ipratropium (ATROVENT) 21 mcg (0.03 %) nasal spray, USE 2 SPRAYS IN EACH NOSTRIL 3 TO 4 TIMES PER DAY, Disp: , Rfl:     multivitamin (THERAGRAN) per tablet, Take 1 tablet by mouth once daily., Disp: , Rfl:   Social History     Socioeconomic History    Marital status:    Tobacco Use    Smoking status: Never Smoker    Smokeless tobacco: Never Used   Substance and Sexual Activity    Alcohol use: Yes     Alcohol/week: 14.0 standard drinks     Types: 14  Glasses of wine per week    Drug use: No    Sexual activity: Yes     Partners: Male     Birth control/protection: Surgical   Social History Narrative    ** Merged History Encounter **            ROS:   General: denies fatigue  Cardiovascular: denies dyspnea, orthopnea    Vitals:    02/14/22 1402   BP: 118/74     General Appearance: Alert, appropriate appearance for age. No acute distress,appropriate affect.   Chest/Respiratory: normal, CTA  Cardiovascular: RRR  Abdl: soft, NT/ND  Pelvic Exam Female: Exam deferred.     Assessment: cervical dysplasia, SUNDAY II  Plan: CKC  I have discussed the risks, benefits, indications, and alternatives of the procedure in detail.  The patient verbalizes her understanding.  All questions answered.  Consents signed.  The patient agrees to proceed to proceed as planned.

## 2022-02-18 PROBLEM — Z98.890 STATUS POST CONE BIOPSY OF CERVIX: Status: ACTIVE | Noted: 2022-02-18

## 2022-03-09 ENCOUNTER — OFFICE VISIT (OUTPATIENT)
Dept: OBSTETRICS AND GYNECOLOGY | Facility: CLINIC | Age: 67
End: 2022-03-09
Payer: MEDICARE

## 2022-03-09 VITALS — HEIGHT: 62 IN | WEIGHT: 142.44 LBS | BODY MASS INDEX: 26.21 KG/M2

## 2022-03-09 DIAGNOSIS — L92.9 GRANULATION TISSUE: ICD-10-CM

## 2022-03-09 DIAGNOSIS — Z09 POSTOPERATIVE EXAMINATION: Primary | ICD-10-CM

## 2022-03-09 PROCEDURE — 3008F BODY MASS INDEX DOCD: CPT | Mod: CPTII,S$GLB,, | Performed by: OBSTETRICS & GYNECOLOGY

## 2022-03-09 PROCEDURE — 17250 PR CHEM CAUTERY GRANULATN TISSUE: ICD-10-PCS | Mod: 58,S$GLB,, | Performed by: OBSTETRICS & GYNECOLOGY

## 2022-03-09 PROCEDURE — 99999 PR PBB SHADOW E&M-EST. PATIENT-LVL II: CPT | Mod: PBBFAC,,, | Performed by: OBSTETRICS & GYNECOLOGY

## 2022-03-09 PROCEDURE — 99024 PR POST-OP FOLLOW-UP VISIT: ICD-10-PCS | Mod: S$GLB,,, | Performed by: OBSTETRICS & GYNECOLOGY

## 2022-03-09 PROCEDURE — 3008F PR BODY MASS INDEX (BMI) DOCUMENTED: ICD-10-PCS | Mod: CPTII,S$GLB,, | Performed by: OBSTETRICS & GYNECOLOGY

## 2022-03-09 PROCEDURE — 99999 PR PBB SHADOW E&M-EST. PATIENT-LVL II: ICD-10-PCS | Mod: PBBFAC,,, | Performed by: OBSTETRICS & GYNECOLOGY

## 2022-03-09 PROCEDURE — 17250 CHEM CAUT OF GRANLTJ TISSUE: CPT | Mod: 58,S$GLB,, | Performed by: OBSTETRICS & GYNECOLOGY

## 2022-03-09 PROCEDURE — 99024 POSTOP FOLLOW-UP VISIT: CPT | Mod: S$GLB,,, | Performed by: OBSTETRICS & GYNECOLOGY

## 2022-03-09 NOTE — PROGRESS NOTES
History of Present Illness:  Pateint presents today 2 weeks postop, 2/18/22, status post [facility], with complaint of spotting    Pap: NILM & HPV 16  Colpo:   1. ENDOCERVICAL CURETTAGE SHOWS SCANT FRAGMENTS OF BENIGN ENDOCERVICAL   GLANDULAR EPITHELIUM AND SMALL DETACHED FRAGMENTS OF SQUAMOUS EPITHELIUM WITH   NO DYSPLASIA IDENTIFIED.   2. CERVICAL BIOPSY AT 1 O'CLOCK SHOWS MILD-TO-MODERATE SQUAMOUS DYSPLASIA   (SUNDAY 1-2).     Procedure(s)  CONE BIOPSY, CERVIX, USING COLD KNIFE (N/A)    Pathology:   1.  CERVIX, CONE BIOPSY:   - NO RESIDUAL DYSPLASIA SEEN.   - BIOPSY CHANGES WITH MONSEL'S SOLUTION EFFECT.   - ATROPHIC AND REACTIVE EPITHELIAL CHANGES.   2.  ENDOCERVICAL CURETTINGS:   - NEGATIVE FOR DYSPLASIA.     POD 19. She is doing well this morning. She is tolerating a regular diet without nausea or vomiting. She is voiding spontaneously. She is ambulating. She has passed flatus, and has a BM. Vaginal bleeding is mild. She denies fever or chills. Abdominal pain is mild and controlled with oral medications.     Past medical and surgical history reviewed.   I have reviewed the patient's medical history in detail and updated the computerized patient record.    Physical exam:  Vital Signs: as above, reviewed.  Constitutional: She is oriented to person, place, and time. She appears well-developed and well-nourished. No distress.   HENT:   Head: Normocephalic and atraumatic.   Eyes: Conjunctivae and EOM are normal. No scleral icterus.   Neck: Normal range of motion. Neck supple. No tracheal deviation present.   Cardiovascular: Normal rate.    Pulmonary/Chest: Effort normal. No respiratory distress. She exhibits no tenderness.  Breasts: deferred  Abdominal: Soft. She exhibits no distension and no mass. There is no rebound and no guarding.   Genitourinary: granulation tissue at cone bed, exudate also noted. Mosels applied.   Musculoskeletal: Normal range of motion.   Lymphadenopathy: No inguinal adenopathy present.    Neurological: She is alert and oriented to person, place, and time. Coordination normal.   Skin: Skin is warm and dry. She is not diaphoretic.   Psychiatric: She has a normal mood and affect.    Assessment:  S/p CKC  SUNDAY II resolved  Postoperative examination    Granulation tissue      Plan:  Follow up HPV testing in 6 months and in 4 weeks to recheck cone bed.

## 2022-04-06 ENCOUNTER — OFFICE VISIT (OUTPATIENT)
Dept: OBSTETRICS AND GYNECOLOGY | Facility: CLINIC | Age: 67
End: 2022-04-06
Payer: MEDICARE

## 2022-04-06 VITALS — BODY MASS INDEX: 26.37 KG/M2 | WEIGHT: 144.19 LBS

## 2022-04-06 DIAGNOSIS — Z09 POSTOPERATIVE EXAMINATION: Primary | ICD-10-CM

## 2022-04-06 PROCEDURE — 1101F PT FALLS ASSESS-DOCD LE1/YR: CPT | Mod: CPTII,S$GLB,, | Performed by: OBSTETRICS & GYNECOLOGY

## 2022-04-06 PROCEDURE — 99024 POSTOP FOLLOW-UP VISIT: CPT | Mod: S$GLB,,, | Performed by: OBSTETRICS & GYNECOLOGY

## 2022-04-06 PROCEDURE — 1159F MED LIST DOCD IN RCRD: CPT | Mod: CPTII,S$GLB,, | Performed by: OBSTETRICS & GYNECOLOGY

## 2022-04-06 PROCEDURE — 99024 PR POST-OP FOLLOW-UP VISIT: ICD-10-PCS | Mod: S$GLB,,, | Performed by: OBSTETRICS & GYNECOLOGY

## 2022-04-06 PROCEDURE — 1159F PR MEDICATION LIST DOCUMENTED IN MEDICAL RECORD: ICD-10-PCS | Mod: CPTII,S$GLB,, | Performed by: OBSTETRICS & GYNECOLOGY

## 2022-04-06 PROCEDURE — 99999 PR PBB SHADOW E&M-EST. PATIENT-LVL III: CPT | Mod: PBBFAC,,, | Performed by: OBSTETRICS & GYNECOLOGY

## 2022-04-06 PROCEDURE — 3008F PR BODY MASS INDEX (BMI) DOCUMENTED: ICD-10-PCS | Mod: CPTII,S$GLB,, | Performed by: OBSTETRICS & GYNECOLOGY

## 2022-04-06 PROCEDURE — 1126F PR PAIN SEVERITY QUANTIFIED, NO PAIN PRESENT: ICD-10-PCS | Mod: CPTII,S$GLB,, | Performed by: OBSTETRICS & GYNECOLOGY

## 2022-04-06 PROCEDURE — 1126F AMNT PAIN NOTED NONE PRSNT: CPT | Mod: CPTII,S$GLB,, | Performed by: OBSTETRICS & GYNECOLOGY

## 2022-04-06 PROCEDURE — 3288F FALL RISK ASSESSMENT DOCD: CPT | Mod: CPTII,S$GLB,, | Performed by: OBSTETRICS & GYNECOLOGY

## 2022-04-06 PROCEDURE — 99999 PR PBB SHADOW E&M-EST. PATIENT-LVL III: ICD-10-PCS | Mod: PBBFAC,,, | Performed by: OBSTETRICS & GYNECOLOGY

## 2022-04-06 PROCEDURE — 3288F PR FALLS RISK ASSESSMENT DOCUMENTED: ICD-10-PCS | Mod: CPTII,S$GLB,, | Performed by: OBSTETRICS & GYNECOLOGY

## 2022-04-06 PROCEDURE — 1101F PR PT FALLS ASSESS DOC 0-1 FALLS W/OUT INJ PAST YR: ICD-10-PCS | Mod: CPTII,S$GLB,, | Performed by: OBSTETRICS & GYNECOLOGY

## 2022-04-06 PROCEDURE — 3008F BODY MASS INDEX DOCD: CPT | Mod: CPTII,S$GLB,, | Performed by: OBSTETRICS & GYNECOLOGY

## 2022-04-06 NOTE — PROGRESS NOTES
History of Present Illness:  Pateint presents today postop, 2/18/22, 7 weeks, status post [facility]    No complaints    Pap: NILM & HPV 16  Colpo:   1. ENDOCERVICAL CURETTAGE SHOWS SCANT FRAGMENTS OF BENIGN ENDOCERVICAL   GLANDULAR EPITHELIUM AND SMALL DETACHED FRAGMENTS OF SQUAMOUS EPITHELIUM WITH   NO DYSPLASIA IDENTIFIED.   2. CERVICAL BIOPSY AT 1 O'CLOCK SHOWS MILD-TO-MODERATE SQUAMOUS DYSPLASIA   (SUNDAY 1-2).     Procedure(s)  CONE BIOPSY, CERVIX, USING COLD KNIFE (N/A)    Pathology:   1.  CERVIX, CONE BIOPSY:   - NO RESIDUAL DYSPLASIA SEEN.   - BIOPSY CHANGES WITH MONSEL'S SOLUTION EFFECT.   - ATROPHIC AND REACTIVE EPITHELIAL CHANGES.   2.  ENDOCERVICAL CURETTINGS:   - NEGATIVE FOR DYSPLASIA.     She is doing well this morning. She is tolerating a regular diet without nausea or vomiting. She is voiding spontaneously. She is ambulating. She has passed flatus, and has a BM. Vaginal bleeding is none. She denies fever or chills. Abdominal pain is mild and controlled with oral medications.     Past medical and surgical history reviewed.   I have reviewed the patient's medical history in detail and updated the computerized patient record.    Physical exam:  Vital Signs: as above, reviewed.  Constitutional: She is oriented to person, place, and time. She appears well-developed and well-nourished. No distress.   HENT:   Head: Normocephalic and atraumatic.   Eyes: Conjunctivae and EOM are normal. No scleral icterus.   Neck: Normal range of motion. Neck supple. No tracheal deviation present.   Cardiovascular: Normal rate.    Pulmonary/Chest: Effort normal. No respiratory distress. She exhibits no tenderness.  Breasts: deferred  Abdominal: Soft. She exhibits no distension and no mass. There is no rebound and no guarding.   Genitourinary: cone bed healed  Musculoskeletal: Normal range of motion.   Lymphadenopathy: No inguinal adenopathy present.   Neurological: She is alert and oriented to person, place, and time. Coordination  normal.   Skin: Skin is warm and dry. She is not diaphoretic.   Psychiatric: She has a normal mood and affect.    Assessment:  S/p CKC  SUNDAY II resolved  Postoperative examination      Plan:  Follow up HPV testing in 6 months

## 2022-10-13 ENCOUNTER — OFFICE VISIT (OUTPATIENT)
Dept: OBSTETRICS AND GYNECOLOGY | Facility: CLINIC | Age: 67
End: 2022-10-13
Payer: MEDICARE

## 2022-10-13 VITALS
DIASTOLIC BLOOD PRESSURE: 62 MMHG | SYSTOLIC BLOOD PRESSURE: 110 MMHG | WEIGHT: 141.56 LBS | BODY MASS INDEX: 25.89 KG/M2

## 2022-10-13 DIAGNOSIS — B97.7 HIGH RISK HPV INFECTION: ICD-10-CM

## 2022-10-13 DIAGNOSIS — N87.1 CIN II (CERVICAL INTRAEPITHELIAL NEOPLASIA II): Primary | ICD-10-CM

## 2022-10-13 PROCEDURE — 87625 HPV TYPES 16 & 18 ONLY: CPT | Mod: 59 | Performed by: OBSTETRICS & GYNECOLOGY

## 2022-10-13 PROCEDURE — 1159F MED LIST DOCD IN RCRD: CPT | Mod: CPTII,S$GLB,, | Performed by: OBSTETRICS & GYNECOLOGY

## 2022-10-13 PROCEDURE — 87624 HPV HI-RISK TYP POOLED RSLT: CPT | Performed by: OBSTETRICS & GYNECOLOGY

## 2022-10-13 PROCEDURE — 88175 CYTOPATH C/V AUTO FLUID REDO: CPT | Performed by: OBSTETRICS & GYNECOLOGY

## 2022-10-13 PROCEDURE — 99999 PR PBB SHADOW E&M-EST. PATIENT-LVL III: ICD-10-PCS | Mod: PBBFAC,,, | Performed by: OBSTETRICS & GYNECOLOGY

## 2022-10-13 PROCEDURE — 1126F PR PAIN SEVERITY QUANTIFIED, NO PAIN PRESENT: ICD-10-PCS | Mod: CPTII,S$GLB,, | Performed by: OBSTETRICS & GYNECOLOGY

## 2022-10-13 PROCEDURE — 3074F PR MOST RECENT SYSTOLIC BLOOD PRESSURE < 130 MM HG: ICD-10-PCS | Mod: CPTII,S$GLB,, | Performed by: OBSTETRICS & GYNECOLOGY

## 2022-10-13 PROCEDURE — 99213 PR OFFICE/OUTPT VISIT, EST, LEVL III, 20-29 MIN: ICD-10-PCS | Mod: S$GLB,,, | Performed by: OBSTETRICS & GYNECOLOGY

## 2022-10-13 PROCEDURE — 1159F PR MEDICATION LIST DOCUMENTED IN MEDICAL RECORD: ICD-10-PCS | Mod: CPTII,S$GLB,, | Performed by: OBSTETRICS & GYNECOLOGY

## 2022-10-13 PROCEDURE — 3078F DIAST BP <80 MM HG: CPT | Mod: CPTII,S$GLB,, | Performed by: OBSTETRICS & GYNECOLOGY

## 2022-10-13 PROCEDURE — 3074F SYST BP LT 130 MM HG: CPT | Mod: CPTII,S$GLB,, | Performed by: OBSTETRICS & GYNECOLOGY

## 2022-10-13 PROCEDURE — 99213 OFFICE O/P EST LOW 20 MIN: CPT | Mod: S$GLB,,, | Performed by: OBSTETRICS & GYNECOLOGY

## 2022-10-13 PROCEDURE — 99999 PR PBB SHADOW E&M-EST. PATIENT-LVL III: CPT | Mod: PBBFAC,,, | Performed by: OBSTETRICS & GYNECOLOGY

## 2022-10-13 PROCEDURE — 3078F PR MOST RECENT DIASTOLIC BLOOD PRESSURE < 80 MM HG: ICD-10-PCS | Mod: CPTII,S$GLB,, | Performed by: OBSTETRICS & GYNECOLOGY

## 2022-10-13 PROCEDURE — 1126F AMNT PAIN NOTED NONE PRSNT: CPT | Mod: CPTII,S$GLB,, | Performed by: OBSTETRICS & GYNECOLOGY

## 2022-10-13 NOTE — PROGRESS NOTES
Chief Complaint   Patient presents with    Repeat pap       History of Present Illness   66 y.o. F patient presents today for pap & HPV testing.     12/2021 Pap:   NILM & HPV 16  1/12/2022 Colpo:   1. ENDOCERVICAL CURETTAGE SHOWS SCANT FRAGMENTS OF BENIGN ENDOCERVICAL   GLANDULAR EPITHELIUM AND SMALL DETACHED FRAGMENTS OF SQUAMOUS EPITHELIUM WITH   NO DYSPLASIA IDENTIFIED.   2. CERVICAL BIOPSY AT 1 O'CLOCK SHOWS MILD-TO-MODERATE SQUAMOUS DYSPLASIA   (SUNDAY 1-2).   2/18/2022 CKC:   1.  CERVIX, CONE BIOPSY:   - NO RESIDUAL DYSPLASIA SEEN.   - BIOPSY CHANGES WITH MONSEL'S SOLUTION EFFECT.   - ATROPHIC AND REACTIVE EPITHELIAL CHANGES.   2.  ENDOCERVICAL CURETTINGS:   - NEGATIVE FOR DYSPLASIA.     Past medical and surgical history reviewed.   I have reviewed the patient's medical history in detail and updated the computerized patient record.  Review of patient's allergies indicates:  No Known Allergies    Review of Systems  Constitutional: negative for chills and fatigue  Gastrointestinal: negative for abdominal pain, constipation, diarrhea, nausea and vomiting  Genitourinary:negative for abnormal menstrual periods, genital lesions and vaginal discharge, dysuria, frequency and hematuria    Physical Examination:  /62   Wt 64.2 kg (141 lb 8.6 oz)   BMI 25.89 kg/m²    Physical Exam:   Constitutional: She appears well-developed and well-nourished. No distress.             Abdominal: Soft. There is no abdominal tenderness.     Genitourinary:    Inguinal canal, vagina, uterus, right adnexa and left adnexa normal.   The external female genitalia was normal.   Labial bartholins normal.Cervix is normal.                 Cervix with scarring from CKC, little cervix left, os stenostic    Assessment/Plan:  SUNDAY II (cervical intraepithelial neoplasia II)  -     Liquid-Based Pap Smear, Screening  -     HPV High Risk Genotypes, PCR    High risk HPV infection  -     Liquid-Based Pap Smear, Screening  -     HPV High Risk Genotypes,  PCR      Patient informed will be contacted with results within 2 weeks. Encouraged to please call back or email if she has not heard from us by then.      I spent a total of 20 minutes on the day of the visit.This includes face to face time and non-face to face time preparing to see the patient (eg, review of tests), obtaining and/or reviewing separately obtained history, documenting clinical information in the electronic or other health record, independently interpreting results and communicating results to the patient/family/caregiver, or care coordinator.

## 2022-10-20 LAB
CLINICAL INFO: NORMAL
CYTO CVX: NORMAL
CYTOLOGIST CVX/VAG CYTO: NORMAL
CYTOLOGIST CVX/VAG CYTO: NORMAL
CYTOLOGY CMNT CVX/VAG CYTO-IMP: NORMAL
CYTOLOGY PAP THIN PREP EXPLANATION: NORMAL
DATE OF PREVIOUS PAP: NORMAL
DATE PREVIOUS BX: NO
GEN CATEG CVX/VAG CYTO-IMP: NORMAL
HPV I/H RISK 4 DNA CVX QL NAA+PROBE: DETECTED
HPV16 DNA CVX QL PROBE+SIG AMP: DETECTED
HPV18 DNA CVX QL PROBE+SIG AMP: NOT DETECTED
LMP START DATE: NORMAL
MICROORGANISM CVX/VAG CYTO: NORMAL
PATHOLOGIST CVX/VAG CYTO: NORMAL
SERVICE CMNT-IMP: NORMAL
SPECIMEN SOURCE CVX/VAG CYTO: NORMAL
STAT OF ADQ CVX/VAG CYTO-IMP: NORMAL

## 2022-11-11 ENCOUNTER — OFFICE VISIT (OUTPATIENT)
Dept: OBSTETRICS AND GYNECOLOGY | Facility: CLINIC | Age: 67
End: 2022-11-11
Payer: MEDICARE

## 2022-11-11 VITALS
DIASTOLIC BLOOD PRESSURE: 72 MMHG | WEIGHT: 142.44 LBS | SYSTOLIC BLOOD PRESSURE: 124 MMHG | BODY MASS INDEX: 26.05 KG/M2

## 2022-11-11 DIAGNOSIS — R87.810 PAPANICOLAOU SMEAR OF CERVIX WITH POSITIVE HIGH RISK HUMAN PAPILLOMA VIRUS (HPV) TEST: Primary | ICD-10-CM

## 2022-11-11 LAB
B-HCG UR QL: NEGATIVE
CTP QC/QA: YES

## 2022-11-11 PROCEDURE — 3074F SYST BP LT 130 MM HG: CPT | Mod: CPTII,S$GLB,, | Performed by: OBSTETRICS & GYNECOLOGY

## 2022-11-11 PROCEDURE — 1159F PR MEDICATION LIST DOCUMENTED IN MEDICAL RECORD: ICD-10-PCS | Mod: CPTII,S$GLB,, | Performed by: OBSTETRICS & GYNECOLOGY

## 2022-11-11 PROCEDURE — 3078F DIAST BP <80 MM HG: CPT | Mod: CPTII,S$GLB,, | Performed by: OBSTETRICS & GYNECOLOGY

## 2022-11-11 PROCEDURE — 57452 EXAM OF CERVIX W/SCOPE: CPT | Mod: S$GLB,,, | Performed by: OBSTETRICS & GYNECOLOGY

## 2022-11-11 PROCEDURE — 1126F PR PAIN SEVERITY QUANTIFIED, NO PAIN PRESENT: ICD-10-PCS | Mod: CPTII,S$GLB,, | Performed by: OBSTETRICS & GYNECOLOGY

## 2022-11-11 PROCEDURE — 99999 PR PBB SHADOW E&M-EST. PATIENT-LVL III: CPT | Mod: PBBFAC,,, | Performed by: OBSTETRICS & GYNECOLOGY

## 2022-11-11 PROCEDURE — 57452 COLPOSCOPY: ICD-10-PCS | Mod: S$GLB,,, | Performed by: OBSTETRICS & GYNECOLOGY

## 2022-11-11 PROCEDURE — 3008F BODY MASS INDEX DOCD: CPT | Mod: CPTII,S$GLB,, | Performed by: OBSTETRICS & GYNECOLOGY

## 2022-11-11 PROCEDURE — 3074F PR MOST RECENT SYSTOLIC BLOOD PRESSURE < 130 MM HG: ICD-10-PCS | Mod: CPTII,S$GLB,, | Performed by: OBSTETRICS & GYNECOLOGY

## 2022-11-11 PROCEDURE — 1159F MED LIST DOCD IN RCRD: CPT | Mod: CPTII,S$GLB,, | Performed by: OBSTETRICS & GYNECOLOGY

## 2022-11-11 PROCEDURE — 1126F AMNT PAIN NOTED NONE PRSNT: CPT | Mod: CPTII,S$GLB,, | Performed by: OBSTETRICS & GYNECOLOGY

## 2022-11-11 PROCEDURE — 81025 POCT URINE PREGNANCY: ICD-10-PCS | Mod: S$GLB,,, | Performed by: OBSTETRICS & GYNECOLOGY

## 2022-11-11 PROCEDURE — 3078F PR MOST RECENT DIASTOLIC BLOOD PRESSURE < 80 MM HG: ICD-10-PCS | Mod: CPTII,S$GLB,, | Performed by: OBSTETRICS & GYNECOLOGY

## 2022-11-11 PROCEDURE — 3008F PR BODY MASS INDEX (BMI) DOCUMENTED: ICD-10-PCS | Mod: CPTII,S$GLB,, | Performed by: OBSTETRICS & GYNECOLOGY

## 2022-11-11 PROCEDURE — 99999 PR PBB SHADOW E&M-EST. PATIENT-LVL III: ICD-10-PCS | Mod: PBBFAC,,, | Performed by: OBSTETRICS & GYNECOLOGY

## 2022-11-11 PROCEDURE — 99499 UNLISTED E&M SERVICE: CPT | Mod: S$GLB,,, | Performed by: OBSTETRICS & GYNECOLOGY

## 2022-11-11 PROCEDURE — 99499 NO LOS: ICD-10-PCS | Mod: S$GLB,,, | Performed by: OBSTETRICS & GYNECOLOGY

## 2022-11-11 PROCEDURE — 81025 URINE PREGNANCY TEST: CPT | Mod: S$GLB,,, | Performed by: OBSTETRICS & GYNECOLOGY

## 2022-11-11 RX ORDER — PREDNISONE 20 MG/1
TABLET ORAL
COMMUNITY
End: 2022-11-14

## 2022-11-14 PROBLEM — E78.2 MIXED HYPERLIPIDEMIA: Status: ACTIVE | Noted: 2022-11-14

## 2022-11-15 NOTE — PROCEDURES
Colposcopy    Date/Time: 11/11/2022 1:20 PM  Performed by: Traci Olson MD  Authorized by: Traci Olson MD     Consent Done?:  Yes (Verbal) and Yes (Written)  Timeout:Immediately prior to procedure a time out was called to verify the correct patient, procedure, equipment, support staff and site/side marked as required  Assistants?: Yes      Colposcopy Site:  Cervix  Position:  Supine  Acrowhite Lesion: No    Atypical Vessels: No    Transformation Zone Adequate?: No    Biopsy?: No    ECC Performed?: No    LEEP Performed?: No     Patient tolerated the procedure well with no immediate complications.   Post-operative instructions were provided for the patient.   Patient was discharged and will follow up if any complications occur     Cervix flush/scarred with vagina, unable to see cervical os. Lugols applied to entire vaginal area, no non staining area noted. No Biopsy or ECC performed.

## 2023-10-02 PROBLEM — R94.39 ABNORMAL STRESS TEST: Status: ACTIVE | Noted: 2023-10-02

## 2024-05-22 ENCOUNTER — OFFICE VISIT (OUTPATIENT)
Dept: ORTHOPEDICS | Facility: CLINIC | Age: 69
End: 2024-05-22
Payer: MEDICARE

## 2024-05-22 DIAGNOSIS — R20.2 PARESTHESIA: ICD-10-CM

## 2024-05-22 DIAGNOSIS — G56.02 CARPAL TUNNEL SYNDROME ON LEFT: ICD-10-CM

## 2024-05-22 PROCEDURE — 1159F MED LIST DOCD IN RCRD: CPT | Mod: CPTII,S$GLB,, | Performed by: ORTHOPAEDIC SURGERY

## 2024-05-22 PROCEDURE — 1101F PT FALLS ASSESS-DOCD LE1/YR: CPT | Mod: CPTII,S$GLB,, | Performed by: ORTHOPAEDIC SURGERY

## 2024-05-22 PROCEDURE — 99999 PR PBB SHADOW E&M-EST. PATIENT-LVL II: CPT | Mod: PBBFAC,,, | Performed by: ORTHOPAEDIC SURGERY

## 2024-05-22 PROCEDURE — 3288F FALL RISK ASSESSMENT DOCD: CPT | Mod: CPTII,S$GLB,, | Performed by: ORTHOPAEDIC SURGERY

## 2024-05-22 PROCEDURE — 99203 OFFICE O/P NEW LOW 30 MIN: CPT | Mod: S$GLB,,, | Performed by: ORTHOPAEDIC SURGERY

## 2024-05-22 PROCEDURE — 1126F AMNT PAIN NOTED NONE PRSNT: CPT | Mod: CPTII,S$GLB,, | Performed by: ORTHOPAEDIC SURGERY

## 2024-05-22 NOTE — PROGRESS NOTES
5/22/2024    Chief Complaint:  Chief Complaint   Patient presents with    Left Hand - Numbness       HPI:  Chance Napier is a 68 y.o. female, who presents to clinic today has a history of intermittent numbness to her left hand.  She states that she has had a TIA in the past which did cause some changes in her hands.  That was transient.  She became concerned because he had some numbness and drooping of her ring and her small finger and did go to the emergency room for a workup.  She was worked up for recurrence of stroke but that was a negative workup.  She again experienced some numbness and tingling returned to the ER where workup was again performed.  She was told that she may have some carpal tunnel syndrome and therefore she was referred to me.    PMHX:  Past Medical History:   Diagnosis Date    Abnormal stress test 09/2023    Anticoagulant long-term use     AR (allergic rhinitis)     Bradycardia     History of shingles     Hyperlipidemia     Palpitations     Polyp, vocal cord 06/2021    Stroke     tia 2017    TIA (transient ischemic attack) 2017       PSHX:  Past Surgical History:   Procedure Laterality Date    ANGIOGRAM, CORONARY, WITH LEFT HEART CATHETERIZATION  10/02/2023    Procedure: Coronary angiogram study;  Surgeon: Keith Cain MD;  Location: [facility] CATH;  Service: Cardiology;;    COLD KNIFE CONIZATION OF CERVIX N/A 02/18/2022    Procedure: CONE BIOPSY, CERVIX, USING COLD KNIFE;  Surgeon: Traci Olson MD;  Location: [facility];  Service: OB/GYN;  Laterality: N/A;    COLONOSCOPY  12/17/2018    Dr. Gonzalez    CORONARY ANGIOGRAPHY  10/02/2023    Procedure: Left heart cath;  Surgeon: Keith Cain MD;  Location: [facility] CATH;  Service: Cardiology;;    EYE SURGERY      TONSILLECTOMY      TUBAL LIGATION      WISDOM TOOTH EXTRACTION         FMHX:  Family History   Adopted: Yes   Problem Relation Name Age of Onset    Learning disabilities Daughter Kenna William     Breast cancer Neg Hx      Ovarian  cancer Neg Hx      Colon cancer Neg Hx         SOCHX:  Social History     Tobacco Use    Smoking status: Never     Passive exposure: Never    Smokeless tobacco: Never   Substance Use Topics    Alcohol use: Yes     Alcohol/week: 2.0 standard drinks of alcohol     Types: 2 Glasses of wine per week     Comment: glass of wine a night       ALLERGIES:  Patient has no known allergies.    CURRENT MEDICATIONS:  Current Outpatient Medications on File Prior to Visit   Medication Sig Dispense Refill    ascorbic acid, vitamin C, (VITAMIN C) 1000 MG tablet Take 1,000 mg by mouth once daily.      aspirin (ECOTRIN) 81 MG EC tablet TAKE 1 TABLET EVERY DAY (Patient taking differently: Take 81 mg by mouth once.) 90 tablet 3    atorvastatin (LIPITOR) 10 MG tablet TAKE 1 TABLET EVERY DAY 90 tablet 1    fluticasone (FLONASE) 50 mcg/actuation nasal spray 1 spray by Each Nare route once daily. 2 in left nare, 1 in right      multivitamin (THERAGRAN) per tablet Take 1 tablet by mouth once daily.      naproxen (NAPROSYN) 375 MG tablet Take 1 tablet (375 mg total) by mouth 2 (two) times daily with meals. 30 tablet 0     No current facility-administered medications on file prior to visit.       REVIEW OF SYSTEMS:  Review of Systems   Constitutional: Negative.    HENT: Negative.     Eyes: Negative.    Respiratory: Negative.     Cardiovascular: Negative.    Gastrointestinal: Negative.    Genitourinary: Negative.    Musculoskeletal: Negative.    Skin: Negative.    Neurological:  Positive for tingling.   Endo/Heme/Allergies: Negative.    Psychiatric/Behavioral: Negative.       GENERAL PHYSICAL EXAM:   There were no vitals taken for this visit.   GEN: well developed, well nourished, no acute distress   HENT: Normocephalic, atraumatic   EYES: No discharge, conjunctiva normal   NECK: Supple, non-tender   PULM: No wheezing, no respiratory distress   CV: RRR   ABD: Soft, non-tender    ORTHO EXAM:   Examination of the left hand and wrist reveals that  there is no edema.  There are no skin changes.  There was no muscular atrophy.  Palpation does not produce significant tenderness.  She does report intact sensation in the median, radial, and ulnar distributions.  She has negative Tinel's at the wrist and at the elbow.  There is negative Durkan's test.  She does have 2+ radial pulses.  She has 1 to 2+ brachioradialis reflex and 2+ biceps reflex.  Has negative Oconnell's test.    RADIOLOGY:   None    ASSESSMENT:   Left carpal tunnel syndrome versus neuropathy    PLAN:  1. I will set her up to do an EMG and nerve conduction study to further assess for the possibility of carpal tunnel versus other cause of this numbness.  Based off of her exam and her description it could be carpal tunnel related but there are also a number of other pathologies that are possible.      2. She will follow up with me after the nerve conduction study is completed for discussion of further treatment options.

## 2024-07-19 ENCOUNTER — OFFICE VISIT (OUTPATIENT)
Dept: PHYSICAL MEDICINE AND REHAB | Facility: CLINIC | Age: 69
End: 2024-07-19
Payer: MEDICARE

## 2024-07-19 DIAGNOSIS — R20.0 NUMBNESS AND TINGLING IN LEFT HAND: ICD-10-CM

## 2024-07-19 DIAGNOSIS — R20.2 NUMBNESS AND TINGLING IN LEFT HAND: ICD-10-CM

## 2024-07-19 PROCEDURE — 99999 PR PBB SHADOW E&M-EST. PATIENT-LVL I: CPT | Mod: PBBFAC,,, | Performed by: PHYSICAL MEDICINE & REHABILITATION

## 2024-07-19 NOTE — PROGRESS NOTES
[facility]   [facility]  [address] [place] [address]  [phone]             Full Name: Chance Napier Gender: Female  Patient ID:  [id number] YOB: 1955      Visit Date: 7/19/2024 10:34 AM  Age: 68 Years  Examining Physician: Magali Magana D.O.   Referring Physician: Samy Matt M.D.   Technologist: KAIT Herrera   Height: 5 feet 2 inch  History: Patient complains of left hand numbness that comes and goes.    Taking Aspirin 81 mg daily.        Sensory Nerve Conduction Study       Nerve / Sites Rec. Site Onset Lat Peak Lat NP Amp PP Amp Segments Distance Peak Diff Velocity Comment     ms ms µV µV  cm ms m/s    L Median - Dig III (Antidromic)      Wrist Index 2.45 3.28 26.9 45.0 Wrist - Index 14  57    L Ulnar - Dig V (Antidromic)      Wrist Dig V 2.50 3.39 27.6 26.6 Wrist - Dig V 14  56    L Radial - Superficial (Antidromic)      Forearm Wrist 1.46 2.19 26.0 14.5 Forearm - Wrist 10  69    L Median, Ulnar - Transcarpal comparison      Median Palm Wrist 1.41 1.98 55.6 58.5 Median Palm - Wrist 8  57       Ulnar Palm Wrist 1.09 1.93 26.4 17.7 Ulnar Palm - Wrist 8  73          Median Palm - Ulnar Palm  0.05         Motor Nerve Conduction Study       Nerve / Sites Muscle Latency Amplitude Segments Dist. Lat Diff Velocity Comments     ms mV  cm ms m/s    L Median - APB      Wrist APB 3.83 9.1 Wrist - APB 8         Elbow APB 7.23 8.8 Elbow - Wrist 21 3.40 61.8    L Ulnar - ADM      Wrist ADM 2.73 10.5 Wrist - ADM 8         B.Elbow ADM 5.48 10.1 B.Elbow - Wrist 17 2.75 61.8       A.Elbow ADM 7.31 10.0 A.Elbow - B.Elbow 11 1.83 60.0        EMG Summary Table     Spontaneous MUAP Recruitment   Muscle IA Fib PSW Fasc CRD Amp Dur. PPP Pattern   L. Deltoid N None None None None N N N N   L. Biceps brachii N None None None None N N N N   L. Triceps brachii N None None None None N N N N   L. Extensor digitorum communis N None None None None N N  N N   L. Pronator teres N None None None None N N N N   L. First dorsal interosseous N None None None None N N N N       Summary    The motor conduction test was normal in all 2 of the tested nerves: L Median - APB, L Ulnar - ADM.    The sensory conduction test was performed on 4 nerve(s). The results were normal in 3 nerve(s): L Median - Dig III (Antidromic), L Ulnar - Dig V (Antidromic), L Radial - Superficial (Antidromic). Findings were unremarkable in 1 nerve(s): L Median, Ulnar - Transcarpal comparison. There were no results outside the specified normal range.      The needle EMG study was normal in all 6 tested muscles: L. Deltoid, L. Biceps brachii, L. Triceps brachii, L. Extensor digitorum communis, L. Pronator teres, L. First dorsal interosseous.       Impression:  Normal study.  No electrophysiologic evidence of left cervical radiculopathy/plexopathy, left median mononeuropathy, left ulnar mononeuropathy, or peripheral neuropathy in the left upper extremity.    ------------------------------  Magali Magana, DO

## 2024-07-22 ENCOUNTER — OFFICE VISIT (OUTPATIENT)
Dept: ORTHOPEDICS | Facility: CLINIC | Age: 69
End: 2024-07-22
Payer: MEDICARE

## 2024-07-22 VITALS — BODY MASS INDEX: 24.83 KG/M2 | HEIGHT: 62 IN | WEIGHT: 134.94 LBS

## 2024-07-22 DIAGNOSIS — G56.02 CARPAL TUNNEL SYNDROME ON LEFT: Primary | ICD-10-CM

## 2024-07-22 PROCEDURE — 99213 OFFICE O/P EST LOW 20 MIN: CPT | Mod: S$GLB,,, | Performed by: ORTHOPAEDIC SURGERY

## 2024-07-22 PROCEDURE — 1101F PT FALLS ASSESS-DOCD LE1/YR: CPT | Mod: CPTII,S$GLB,, | Performed by: ORTHOPAEDIC SURGERY

## 2024-07-22 PROCEDURE — 3008F BODY MASS INDEX DOCD: CPT | Mod: CPTII,S$GLB,, | Performed by: ORTHOPAEDIC SURGERY

## 2024-07-22 PROCEDURE — 3288F FALL RISK ASSESSMENT DOCD: CPT | Mod: CPTII,S$GLB,, | Performed by: ORTHOPAEDIC SURGERY

## 2024-07-22 PROCEDURE — 99999 PR PBB SHADOW E&M-EST. PATIENT-LVL III: CPT | Mod: PBBFAC,,, | Performed by: ORTHOPAEDIC SURGERY

## 2024-07-22 PROCEDURE — 1159F MED LIST DOCD IN RCRD: CPT | Mod: CPTII,S$GLB,, | Performed by: ORTHOPAEDIC SURGERY

## 2024-07-22 PROCEDURE — 1126F AMNT PAIN NOTED NONE PRSNT: CPT | Mod: CPTII,S$GLB,, | Performed by: ORTHOPAEDIC SURGERY

## 2024-07-22 NOTE — PROGRESS NOTES
Ms Napier returns to clinic today.  She has a history of some numbness to her left hand with paresthesias.  We did send her for a nerve conduction study.  She states that she was had resolution of her symptoms with wearing of a splint.      Physical exam: Examination of the left hand reveals that there is no edema.  There are no skin changes.  Sensation is grossly intact in the median radial ulnar distribution.  Has negative Tinel's.  She does have capillary refill less than 2 seconds     EMG nerve conduction study: Nerve conduction study has been reviewed it was a normal study     Assessment: Transient left carpal tunnel syndrome     Plan:    1.  The patient is on longer having any symptoms and has a nerve remove conduction study which is normal.  We will therefore continue to monitor.  If she has any further worsening of symptoms or any other complaints she can follow-up with me at that time

## 2024-09-13 PROBLEM — I73.9 PERIPHERAL VASCULAR DISEASE, UNSPECIFIED: Status: ACTIVE | Noted: 2024-09-13

## 2024-10-17 ENCOUNTER — OFFICE VISIT (OUTPATIENT)
Dept: GASTROENTEROLOGY | Facility: CLINIC | Age: 69
End: 2024-10-17
Payer: MEDICARE

## 2024-10-17 DIAGNOSIS — K58.0 IRRITABLE BOWEL SYNDROME WITH DIARRHEA: Primary | ICD-10-CM

## 2024-10-17 PROCEDURE — 99214 OFFICE O/P EST MOD 30 MIN: CPT | Mod: 95,,,

## 2024-10-17 NOTE — PATIENT INSTRUCTIONS
--Probiotics would be good to add to your daily regimen  --Get specimen container and collect and submit stools ordered today  --Start Rifaximin  [facility]  High Fiber Diet    20-30 grams of fiber per day is recommended    Fiber cereal = 5 grams (Raisin Bran, Shredded Wheat, Grape Nuts)  Konsyl 1 teaspoon = 6 grams  Metamucil 1 tablespoon = 3 grams  Citrucel 1 tablespoon = 2 grams  Fiber Choice = 3-4 per day    Drink at least 4-5 glasses of liquids per day or the fiber can be constipating rather then stimulating to your gut.  Boil and bake potatoes in their skin. Eat the skin, too.  Include fresh fruits and raw vegetables in your daily diet. Raw fruits and vegetables have more useful fiber than those that have been peeled, cooked, pureed, or otherwise processed.  Eat a wide variety of fibrous foods in reasonable amounts. Increase fiber intake slowly especially if you have been on a low-fiber diet.  Eat more legumes-peas, beans, soybeans.  For snacks, try dried fruit, whole wheat and rye crackers.  Avoid instant-cook hot cereals. Use the longer cooking cereals.  Use bran whenever possible. Sprinkle it on top of cereal, mix it into mashed potatoes or hamburger meat, or use it in combination dishes such as meat loaf.   Substitute whole grain, whole wheat and bran products for white flour products.  Eat slowly and chew your food thoroughly.    Psyllium has been shown to improve both constipation and diarrhea. Fiber may increase bulk of stool and may also include alterations in the production of gaseous fermentation products and changes to the gut microbiome. As some patients may experience increased bloating and gas, we suggest a low starting dose of psyllium that provides approximately 3 to 4 grams of soluble fiber per day. The soluble fiber content of psyllium products (ie, per packet, teaspoon, or pill) varies widely; refer to product-specific label to determine dose. The dose should then be slowly  titrated up based on response to treatment.     Foods High in Fiber    This diet furnishes adequate amounts of all the essential nutrients needed by the body and a very liberal fiber or roughage content. Roughage is indigestible fiber found in fruits, vegetables and whole grain cereals. It provides bulk to the large intestine and, accompanied by an adequate fluid intake, is a stimulant to elimination. Regular eating and elimination habits are vital to good health.     Fruits:  Use all fruits and juices liberally; fresh, cooked, dried or canned. Eat fruit raw and with skins when possible. Have at least four servings of fruit daily including a citrus fruit and a stewed dried fruit. Hard seeds of fruits (berries, figs, Grapes, mangoes, tomatoes) etc. may be removed.    Vegetables: Use all vegetables liberally. Green leafy vegetables, such as cabbage, spinach, lettuce, broccoli, and other greens are particularly good.    Potato: As desired. Serve baked frequently and eat the skin. Other starchy foods such as rice, macaroni, etc., may be occasionally substituted. Chew popcorn well and do not eat hard kernels.    Meat, Fish, Poultry: One or two servings daily.    Eggs: One daily if you are not on a low cholesterol diet.    Milk: Include at least one-half pint daily. Whole milk or skimmed may be used.     Cereals: Use whole grain breads and cereals such as bran, bran flakes, grape nut flakes, puffed wheat, oatmeal, Wheaties, etc., as much as possible. Refined breaks and cereals are not restricted; however, they do not contain the bulk necessary for this diet.     Sugars, Sweets: Use very moderately. Depend on fruit as dessert.    Fats: Use butter or margarine as desired. Oil or dressing on salads as desired. Fried foods may be used in moderation. Nuts may be eaten if you chew them well and may be ground or finely chopped for cooking.   Sample Menu                                                                                  Breakfast                          Lunch  Orange juice, 4 ounces                                                Vegetable soup                    Stewed fruit                                                                 Fresh fruit plate with cottage cheese  Shredded wheat                                                           Whole wheat toast  Scrambled eggs                                                           Butter  Whole wheat toast                                                       Coffee or tea  Dinner                                                                         Bedtime  Large glass tomato juice                                             1 glass milk  Roast beef                                                                   stewed fruit  Baked potato with skin  Buttered spinach  Raw vegetable salad  Baked apple with skin   Coffee or tea

## 2024-10-17 NOTE — PROGRESS NOTES
The patient location is: Home  The chief complaint leading to consultation is: diarrhea    Visit type: audiovisual    Face to Face time with patient: 13 minutes  25 minutes of total time spent on the encounter, which includes face to face time and non-face to face time preparing to see the patient (eg, review of tests), Obtaining and/or reviewing separately obtained history, Documenting clinical information in the electronic or other health record, Independently interpreting results (not separately reported) and communicating results to the patient/family/caregiver, or Care coordination (not separately reported).     Each patient to whom he or she provides medical services by telemedicine is:  (1) informed of the relationship between the physician and patient and the respective role of any other health care provider with respect to management of the patient; and (2) notified that he or she may decline to receive medical services by telemedicine and may withdraw from such care at any time.                                                                                 Gastroenterology Progress Note    Reason for Visit:  The encounter diagnosis was Irritable bowel syndrome with diarrhea.    PCP:   Radha Browning   No address on file      Initial HPI   This is a 68 y.o. female presenting for diarrhea. Recently presented to the ER 9/1/2024 for acute cystitis and was subsequently treated with Keflex. About 2 weeks after this, she went to [place] to visit her daughter and developed diarrhea that lasted a couple of weeks. Got better after a while, but her symptoms recurred on Tuesday. Tried Kaopectate which does help her. Still with intermittent diarrhea. When it occurs, it is liquid in consistency. Can occur a couple of times a day. Does drink fairlife milk. Has not seen any blood in her stool, unintentional weight loss, nausea, vomiting. No other sick contacts and uncertain if she could have eaten something that  caused her symptoms.  Stool studies ordered by her PCP noted to be negative for C. Diff, giardia/cryptosporidium, Rotavirus. Patient also reports long history of excess flatus. Feels like it is more than normal. Has always been like this, this is not new to her.     Patient completed Cologuard in January that was noted to be negative. She reported to me that she would prefer to forgo colonoscopy as her close friend went in for a routine colonoscopy and perforated and subsequently . This frightens her and she would like to defer at this time.       ROS:  Review of Systems   Constitutional:  Negative for chills, fever and weight loss.   Eyes:  Negative for redness.   Respiratory:  Negative for cough and wheezing.    Cardiovascular:  Negative for chest pain.   Gastrointestinal:  Positive for abdominal pain and diarrhea. Negative for blood in stool, constipation, heartburn, melena, nausea and vomiting.   Skin:  Negative for rash.   Neurological:  Negative for seizures, loss of consciousness and weakness.        Medical History:  has a past medical history of Abnormal stress test (2023), Anticoagulant long-term use, AR (allergic rhinitis), Bradycardia, History of shingles, Hyperlipidemia, Palpitations, Polyp, vocal cord (2021), Stroke, and TIA (transient ischemic attack) ().    Surgical History:  has a past surgical history that includes Eye surgery; Tubal ligation; [place] tooth extraction; Tonsillectomy; Colonoscopy (2018); Cold knife conization of cervix (N/A, 2022); Coronary angiography (10/02/2023); and ANGIOGRAM, CORONARY, WITH LEFT HEART CATHETERIZATION (10/02/2023).    Family History: family history includes Learning disabilities in her daughter. She was adopted..       Review of patient's allergies indicates:  No Known Allergies    Current Outpatient Medications on File Prior to Visit   Medication Sig Dispense Refill    ascorbic acid, vitamin C, (VITAMIN C) 1000 MG tablet Take 1,000 mg  by mouth once daily.      atorvastatin (LIPITOR) 10 MG tablet TAKE 1 TABLET EVERY DAY 90 tablet 3    fluticasone (FLONASE) 50 mcg/actuation nasal spray 1 spray by Each Nare route once daily. 2 in left nare, 1 in right      multivitamin (THERAGRAN) per tablet Take 1 tablet by mouth once daily.      [DISCONTINUED] aspirin (ECOTRIN) 81 MG EC tablet TAKE 1 TABLET EVERY DAY (Patient taking differently: Take 81 mg by mouth once.) 90 tablet 3    [DISCONTINUED] cephALEXin (KEFLEX) 500 MG capsule Take 1 capsule (500 mg total) by mouth 2 (two) times a day. 14 capsule 0     No current facility-administered medications on file prior to visit.         Objective Findings:    Vital Signs:  There were no vitals taken for this visit.  There is no height or weight on file to calculate BMI.    Physical Exam:  Physical Exam  Neurological:      Mental Status: She is alert and oriented to person, place, and time.             Labs:  Lab Results   Component Value Date    WBC 12.86 (H) 09/01/2024    HGB 13.1 09/01/2024    HCT 38.6 09/01/2024     09/01/2024    CHOL 140 11/15/2022    TRIG 64 11/15/2022    HDL 52 11/15/2022    ALKPHOS 90 09/01/2024    ALT 44 (H) 09/01/2024    AST 40 (H) 09/01/2024     09/01/2024    K 4.0 09/01/2024     09/01/2024    CREATININE 0.73 09/01/2024    BUN 23 (H) 09/01/2024    CO2 24 09/01/2024    TSH 3.190 08/16/2023    INR 1.0 05/08/2024         Imaging reviewed: No pertinent imaging reviewed       Endoscopy reviewed: Colonoscopy 12/17/2018  --Poor prep, was recommended to have repeat in 1 year    Cologuard in January that was negative.    Assessment:  1. Irritable bowel syndrome with diarrhea             Recommendations:  Additional stool studies ordered to evaluated for inflammation, malabsorption, infection. Patient would benefit from Rifaximin trial for possible post-infectious IBS. Recommended Fiber and probiotics.       Thank you for allowing me to participate in this patient's  care.    Sincerely,     Loraine Lock NP  Gastroenterology Department  [facility]

## 2024-10-29 ENCOUNTER — PATIENT MESSAGE (OUTPATIENT)
Dept: GASTROENTEROLOGY | Facility: CLINIC | Age: 69
End: 2024-10-29
Payer: MEDICARE